# Patient Record
Sex: FEMALE | Race: WHITE | Employment: OTHER | URBAN - METROPOLITAN AREA
[De-identification: names, ages, dates, MRNs, and addresses within clinical notes are randomized per-mention and may not be internally consistent; named-entity substitution may affect disease eponyms.]

---

## 2017-01-06 ENCOUNTER — HOSPITAL ENCOUNTER (OUTPATIENT)
Dept: CT IMAGING | Age: 62
Discharge: HOME | End: 2017-01-06
Payer: SELF-PAY

## 2017-01-06 DIAGNOSIS — Z13.6 SCREENING FOR ISCHEMIC HEART DISEASE: ICD-10-CM

## 2017-01-06 PROCEDURE — 75571 CT HRT W/O DYE W/CA TEST: CPT

## 2017-01-09 ENCOUNTER — TELEPHONE (OUTPATIENT)
Dept: CARDIAC REHAB | Age: 62
End: 2017-01-09

## 2017-01-09 NOTE — TELEPHONE ENCOUNTER
Called patient to review CT scan results. Verified patient's date of birth. Discussed results of CT scan. Her calcium score is 0. This corresponds to no plaque noted in the coronary arteries. Her risk for a heart attack is very low. The chance of patient developing heart disease at this point is less than 1%. Patient verbalized understanding of results. Will fax report to her PCP, Jerry Kim and RIKA Ayala.

## 2017-04-05 ENCOUNTER — HOSPITAL ENCOUNTER (OUTPATIENT)
Dept: MAMMOGRAPHY | Age: 62
Discharge: HOME | End: 2017-04-05
Payer: OTHER GOVERNMENT

## 2017-04-05 DIAGNOSIS — Z12.31 VISIT FOR SCREENING MAMMOGRAM: ICD-10-CM

## 2017-04-05 PROCEDURE — 77063 BREAST TOMOSYNTHESIS BI: CPT

## 2018-04-06 ENCOUNTER — HOSPITAL ENCOUNTER (OUTPATIENT)
Dept: MAMMOGRAPHY | Age: 63
Discharge: HOME | End: 2018-04-06
Attending: OBSTETRICS & GYNECOLOGY
Payer: OTHER GOVERNMENT

## 2018-04-06 DIAGNOSIS — Z12.39 BREAST CANCER SCREENING: ICD-10-CM

## 2018-04-06 PROCEDURE — 77063 BREAST TOMOSYNTHESIS BI: CPT

## 2018-04-13 ENCOUNTER — HOSPITAL ENCOUNTER (OUTPATIENT)
Dept: LAB | Age: 63
Discharge: HOME | End: 2018-04-13
Payer: OTHER GOVERNMENT

## 2018-04-13 PROCEDURE — 88175 CYTOPATH C/V AUTO FLUID REDO: CPT | Performed by: OBSTETRICS & GYNECOLOGY

## 2018-04-13 PROCEDURE — 87624 HPV HI-RISK TYP POOLED RSLT: CPT | Performed by: OBSTETRICS & GYNECOLOGY

## 2019-04-08 ENCOUNTER — HOSPITAL ENCOUNTER (OUTPATIENT)
Dept: MAMMOGRAPHY | Age: 64
Discharge: HOME | End: 2019-04-08
Attending: OBSTETRICS & GYNECOLOGY
Payer: OTHER GOVERNMENT

## 2019-04-08 DIAGNOSIS — Z12.39 BREAST SCREENING, UNSPECIFIED: ICD-10-CM

## 2019-04-08 PROCEDURE — 77067 SCR MAMMO BI INCL CAD: CPT

## 2020-06-15 ENCOUNTER — HOSPITAL ENCOUNTER (OUTPATIENT)
Dept: MAMMOGRAPHY | Age: 65
Discharge: HOME | End: 2020-06-15
Attending: OBSTETRICS & GYNECOLOGY
Payer: OTHER GOVERNMENT

## 2020-06-15 DIAGNOSIS — Z12.31 VISIT FOR SCREENING MAMMOGRAM: ICD-10-CM

## 2020-06-15 PROCEDURE — 77063 BREAST TOMOSYNTHESIS BI: CPT

## 2020-06-22 ENCOUNTER — HOSPITAL ENCOUNTER (OUTPATIENT)
Dept: MAMMOGRAPHY | Age: 65
Discharge: HOME | End: 2020-06-22
Attending: OBSTETRICS & GYNECOLOGY
Payer: OTHER GOVERNMENT

## 2020-06-22 ENCOUNTER — HOSPITAL ENCOUNTER (OUTPATIENT)
Dept: ULTRASOUND IMAGING | Age: 65
Discharge: HOME | End: 2020-06-22
Attending: OBSTETRICS & GYNECOLOGY
Payer: OTHER GOVERNMENT

## 2020-06-22 DIAGNOSIS — R92.0 MICROCALCIFICATION OF RIGHT BREAST ON MAMMOGRAM: ICD-10-CM

## 2020-06-22 DIAGNOSIS — R92.8 ABNORMALITY OF RIGHT BREAST ON SCREENING MAMMOGRAM: ICD-10-CM

## 2020-06-22 DIAGNOSIS — R92.2 INCONCLUSIVE MAMMOGRAM: ICD-10-CM

## 2020-06-22 PROCEDURE — 76642 ULTRASOUND BREAST LIMITED: CPT

## 2020-06-22 PROCEDURE — 77061 BREAST TOMOSYNTHESIS UNI: CPT

## 2020-06-22 PROCEDURE — 77065 DX MAMMO INCL CAD UNI: CPT

## 2020-07-09 ENCOUNTER — HOSPITAL ENCOUNTER (OUTPATIENT)
Dept: MAMMOGRAPHY | Age: 65
Discharge: HOME | End: 2020-07-09
Attending: OBSTETRICS & GYNECOLOGY
Payer: OTHER GOVERNMENT

## 2020-07-09 DIAGNOSIS — R92.8 ABNORMAL FINDINGS ON DIAGNOSTIC IMAGING OF BREAST: ICD-10-CM

## 2020-07-09 PROCEDURE — 88360 TUMOR IMMUNOHISTOCHEM/MANUAL: CPT

## 2020-07-09 PROCEDURE — 88305 TISSUE EXAM BY PATHOLOGIST: CPT

## 2020-07-09 PROCEDURE — 77030030538 MAM STEREO VAC  BX BREAST RT 1ST LESION W/CLIP AND SPECIMEN

## 2020-07-09 PROCEDURE — 74011000250 HC RX REV CODE- 250: Performed by: RADIOLOGY

## 2020-07-09 RX ORDER — LIDOCAINE HYDROCHLORIDE 10 MG/ML
1-15 INJECTION, SOLUTION EPIDURAL; INFILTRATION; INTRACAUDAL; PERINEURAL
Status: DISCONTINUED | OUTPATIENT
Start: 2020-07-09 | End: 2020-07-13 | Stop reason: HOSPADM

## 2020-07-09 RX ORDER — LIDOCAINE HYDROCHLORIDE AND EPINEPHRINE 10; 10 MG/ML; UG/ML
1-20 INJECTION, SOLUTION INFILTRATION; PERINEURAL
Status: DISCONTINUED | OUTPATIENT
Start: 2020-07-09 | End: 2020-07-13 | Stop reason: HOSPADM

## 2020-07-09 RX ADMIN — LIDOCAINE HYDROCHLORIDE,EPINEPHRINE BITARTRATE 10 ML: 10; .01 INJECTION, SOLUTION INFILTRATION; PERINEURAL at 10:58

## 2020-07-09 RX ADMIN — LIDOCAINE HYDROCHLORIDE 10 ML: 10 INJECTION, SOLUTION EPIDURAL; INFILTRATION; INTRACAUDAL; PERINEURAL at 10:52

## 2020-07-09 NOTE — DISCHARGE INSTRUCTIONS
Patient Education        Stereotactic Breast Biopsy: About This Test  What is it? A stereotactic breast biopsy removes a sample of breast tissue that is looked at under a microscope to check for breast cancer. It uses computerized X-rays to precisely locate where to get the tissue samples. Why is this test done? A breast biopsy is most often done to check a lump found during a breast exam or a suspicious area found on a mammogram or other imaging. A stereotactic test is most useful when a doctor can see a problem in the breast on a mammogram but can't locate it by touch. How do you prepare for the test?  If you take aspirin or some other blood thinner, ask your doctor if you should stop taking it before your test. Make sure that you understand exactly what your doctor wants you to do. These medicines increase the risk of bleeding. How is the test done? · You may sit in a chair. Or you may lie on your stomach on a table that has a hole for your breast to hang through. · When the area in your breast is numb, a small cut (incision) is made in the skin. · Using the imaging, the doctor will guide the needle into the biopsy area. · A sample of breast tissue is taken through the needle. · A small clip is usually inserted into your breast to fabiola the biopsy site. · The needle is removed and pressure is put on the needle site to stop any bleeding. · A bandage is put on the needle site. How long does the test take? The test will take about 60 minutes. Most of the time is spent preparing for the images and finding the area for the biopsy. What happens after the test?  · You'll be told how long it may take to get your results back. · You will probably be able to go home right away. · After a specialist looks at the biopsy sample for signs of cancer, your doctor's office will let you know the results.   · If the test results aren't clear, you may have another biopsy or test.  How can you care for yourself at home?  · You can go back to your usual activities right away. But avoid heavy lifting for 24 hours. · The site may be tender for 2 or 3 days. You may also have some bruising, swelling, or slight bleeding. ? You can use an ice pack. Put ice or a cold pack on the area for 10 to 20 minutes at a time. Put a thin cloth between the ice and your skin. ? Ask your doctor if you can take an over-the-counter pain medicine, such as acetaminophen (Tylenol), ibuprofen (Advil, Motrin), or naproxen (Aleve). Be safe with medicines. Read and follow all instructions on the label. Follow-up care is a key part of your treatment and safety. Be sure to make and go to all appointments, and call your doctor if you are having problems. It's also a good idea to keep a list of the medicines you take. Ask your doctor when you can expect to have your test results. Where can you learn more? Go to http://rosalind-jasmyne.info/  Enter Y286 in the search box to learn more about \"Stereotactic Breast Biopsy: About This Test.\"  Current as of: August 22, 2019               Content Version: 12.5  © 4257-6497 [employer]. Care instructions adapted under license by [employer] (which disclaims liability or warranty for this information). If you have questions about a medical condition or this instruction, always ask your healthcare professional. [place] [address] any warranty or liability for your use of this information. POST STEREOTACTIC BIOPSY INSTRUCTIONS    1. WOUND CARE:   -Please keep an ice pack on the biopsy site for 20 minutes each hour through the day of the procedure. May     discontinue ice at bedtime. Do not apply ice directly to skin, protect by using thin cloth covering.    -The morning after biopsy, you may remove the gauze pad dressing. DO NOT REMOVE STERI-STRIPS(TAPE    STRIPS ON SKIN) FROM BIOPSY SITE. Allow them to wear off in 3-4 days.     2. Please wear your most supportive bra for 24 hours. Sleep in your bra the night of the biopsy. This will reduce the risk of bleeding, also help to hold ice pack in place. 3. You may shower after 24 hours, otherwise please keep site dry. Steri-strips may get wet. 4. If bleeding occurs from the site:      Apply direct pressure to the site, holding it tightly for 5 minutes      If bleeding continues after 5 minutes, call your physician  5. NO HEAVY LIFTING (GREATER THAN 5 lbs), vacuuming and/or sports activities for 48 hours after biopsy  6. Your pathology results are usually complete within 48 hours of the procedure time, excluding weekends. 7. You may take TYLENOL extra strength for pain. NO aspirin or aspirin containing products. 8. Call your doctor for a follow up appointment.

## 2020-07-16 ENCOUNTER — OFFICE VISIT (OUTPATIENT)
Dept: SURGERY | Age: 65
End: 2020-07-16

## 2020-07-16 VITALS
DIASTOLIC BLOOD PRESSURE: 79 MMHG | BODY MASS INDEX: 26.52 KG/M2 | HEIGHT: 66 IN | WEIGHT: 165 LBS | HEART RATE: 70 BPM | TEMPERATURE: 97.3 F | SYSTOLIC BLOOD PRESSURE: 144 MMHG | RESPIRATION RATE: 18 BRPM

## 2020-07-16 DIAGNOSIS — D05.11 DUCTAL CARCINOMA IN SITU (DCIS) OF RIGHT BREAST: Primary | ICD-10-CM

## 2020-07-16 PROBLEM — D05.10 DCIS (DUCTAL CARCINOMA IN SITU): Status: ACTIVE | Noted: 2020-07-16

## 2020-07-16 RX ORDER — LISINOPRIL AND HYDROCHLOROTHIAZIDE 10; 12.5 MG/1; MG/1
TABLET ORAL
COMMUNITY
Start: 2020-06-22 | End: 2020-07-30

## 2020-07-16 RX ORDER — ROSUVASTATIN CALCIUM 20 MG/1
TABLET, COATED ORAL DAILY
COMMUNITY
Start: 2020-05-29

## 2020-07-16 RX ORDER — GLIMEPIRIDE 2 MG/1
TABLET ORAL
COMMUNITY
Start: 2020-05-05

## 2020-07-16 NOTE — H&P (VIEW-ONLY)
Breast Cancer Consult Ms. 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 The area of concern was identified on screenig imaging. She denies palpable mass. She denies nipple discharge. She denies breast pain. There is no family history of breast cancer. Her father had non-Hodgkin's cancer. Her mother had unknown cancer, possibly pancreatic. Her most recent previous mammogram was 4/8/19. Breast/GYN history:   
OB History No obstetric history on file. Past Medical History:  
Diagnosis Date  Diabetes ([facility] [address]  Hypercholesterolemia  Hypertension  Menopause Past Surgical History:  
Procedure Laterality Date  HX ORTHOPAEDIC  1983  
 jaw surgery Current Outpatient Medications on File Prior to Visit Medication Sig Dispense Refill  glimepiride (AMARYL) 2 mg tablet  lisinopril-hydroCHLOROthiazide (PRINZIDE, ZESTORETIC) 10-12.5 mg per tablet TK 1 T PO D    
 rosuvastatin (CRESTOR) 20 mg tablet No current facility-administered medications on file prior to visit. No Known Allergies Social History Tobacco Use  Smoking status: Former Smoker  Smokeless tobacco: Never Used Substance Use Topics  Alcohol use: Yes Comment: occasional social   
 Drug use: Not on file Family History Problem Relation Age of Onset  Cancer Mother  Cancer Father   
     lymphoma  Heart Disease Father ROS: positives are bolded General: fevers, chills, night sweats, fatigue, weight loss, weight gain GI: nausea, vomiting, abdominal pain, change in bowel habits, hematochezia, melena, GERD Integ: dermatitis, abnormal moles HEENT: visual changes, vertigo, epistaxis, dysphagia, hoarseness Cardiac: chest pain, palpitations, HTN, edema, varicosities Resp: cough, shortness of breath, wheezing, hemoptysis, snoring, reactive airway disease : hematuria, dysuria, frequency, urgency, nocturia, stress urinary incontinence MSK: weakness, joint pain, arthritis Endocrine:  thyroid disease, polyuria, polydipsia, polyphagia, poor wound healing, heat intolerance, cold intolerance Lymph/Heme: anemia, bruising, history of blood transfusions Neuro: dizziness, headache, fainting, seizures, stroke Psych: anxiety, depression Physical Exam: 
Visit Vitals /79 (BP 1 Location: Right arm, BP Patient Position: Sitting) Pulse 70 Temp 97.3 °F (36.3 °C) (Oral) Resp 18 Ht 5' 6\" (1.676 m) Wt 74.8 kg (165 lb) BMI 26.63 kg/m² Gen:  No distress Head: normocephalic, atraumatic Mouth: Clear, no overt lesions, oral mucosa pink and moist 
Neck: supple, no masses, no adenopathy, trachea midline Resp: clear bilaterally Cardio: Regular rate and rhythm Abdomen: soft, nontender, nondistended Extremeties: warm, well-perfused Neuro: sensation and strength grossly intact and symmetrical 
Psych: alert and oriented to person, place and time Breasts:  
Right: Examined in both the supine and upright positions. There was no supraclavicular, infraclavicular, or axillary lympadenopathy. There were no dominant masses, no skin changes, no asymmetry identified core biopsy wound clean upper outer Left: Examined in both the supine and upright positions. There was no supraclavicular, infraclavicular, or axillary lympadenopathy. There were no dominant masses, no skin changes, no asymmetry identified Imagin20 right mammo/ultrasound Developing asymmetry with associated indeterminate microcalcifications in the 
upper outer posterior third of the right breast for which biopsy is indicated. 
  
Results discussed with the patient at the time of the examination. 
  
BI-RADS Assessment Category 4: Suspicious 6/15/20 bilateral mammogram 
Right breast focal asymmetry and clustered calcifications. Recommend diagnostic right breast tomosynthesis mammogram and possible right breast ultrasound for 
further evaluation. A result letter will be sent to the patient. 
  
BI-RADS Assessment Category 0: Incomplete. Need additional imaging evaluation. Pathology: 
7/9/20 RIGHT BREAST, CORE BIOPSIES:  
DUCTAL CARCINOMA IN SITU, COMEDO TYPE, NUCLEAR GRADE 3. Estrogen Receptor (ER): Negative (0%, Internal controls present and stain as expected). Impression: 
Patient Active Problem List  
Diagnosis Code  DCIS (ductal carcinoma in situ) D05.10  
  
 
    
 63year old woman with right ER negative ductal carcinoma in-situ (DCIS). We reviewed the pathology in detail. We discussed how DCIS differs from invasive cancer. We discussed that there are many options for treatment of breast cancer. Surgery, chemotherapy, radiation and hormone therapy are all tools that may be used in the treatment of breast cancer. For each patient, we determine which will be most beneficial based on her individual set of circumstances. For some patients all four treatment categories will be recommended. For others one or more of these options are not appropriate. Regarding surgery, there are two main options, lumpectomy or mastectomy. We discussed both in detail. Overall survival and distant recurrence rates are the same. The decision is generally a personal decision more so than a medical one. Lumpectomy is also known as breast conservation surgery or partial mastectomy. The goal is to remove the area of concern as well as surrounding area of uninvolved tissue (\"clear margins\"). Radiation is almost always recommended with lumpectomy to allow for acceptable local recurrence rates. Local recurrence rates are approximately 6% after lumpectomy with radiation.   Risks, benefits and options were discussed in detail to include, but not limited to, bleeding, infection, risks of anesthesia, injury to surrounding structures and other unforeseen events such as stroke, heart attack or death. Mastectomy was then addressed. With mastectomy, almost all of the breast tissue is removed. We are not able to remove 100% of the breast tissue. The risk of local recurrence is approximately 2-4% after mastectomy. The overlying skin is generally numb. Most often, the numbness is permanent. Mastectomy can be performed with or without reconstruction. The reconstruction is performed by the plastic surgeon. Commonly it is a multi-step process with placement of tissue expanders as the first step. If she is interested in reconstruction, I will refer her to plastic surgery. The reconstructed breast differs in many ways from the native breast.  The goal is that in a bra or clothing, no one can tell she has had a mastectomy. Radiation generally is not needed after mastectomy. There are some circumstances, usually based on size, margins, local extension or lymph node status, where post-mastectomy radiation is recommended. Risks, benefits and options were discussed in detail to include, but not limited to, bleeding, infection, risks of anesthesia, injury to surrounding structures and other unforeseen events such as stroke, heart attack or death. Regarding lymph nodes, as she is clinically node negative, she is a candidate for sentinel node biopsy if she elects for mastectomy. If she prefers lumpectomy, sentinel node biopsy would not be necessary as we can go back and perform at a subsequent time in the event invasive cancer is noted on final specimen. We discussed this procedure is a targeted sampling of the axillary lymph nodes to allow for staging of her disease. She will be injected with radioactive isotope as well as blue dye to allow for mapping and removal of the sentinel nodes.   By removing only the sentinel nodes and not performing axillary node dissection, she has a decreased risk of lymphedema (arm swelling) and nerve injury. We did specifically discuss that both of these risks still exist.   Risks, benefits and options were discussed in detail to include, but not limited to, bleeding, infection, risks of anesthesia, injury to surrounding structures and other unforeseen events such as stroke, heart attack or death. If a significant amount of cancer is noted in her lymph nodes, an axillary lymph node dissection may be recommended. In this procedure more, but not all, of the lymph nodes under the arm are removed. The chance of both lymphedema and nerve injury are increased with axillary node dissection compared to sentinel node biopsy. I generally recommend referral to physical therapy and a lymphedema specialist if an axillary node dissection is performed. Chemotherapy was addressed. Chemotherapy is systemic treatment aimed largely to decrease chance of spread or recurrence of cancer. It is administered by a medical oncologist.  Often the decision for chemotherapy is made after final pathology. Chemotherapy is not recommended for DCIS. We discussed radiation. Radiation is a local therapy aimed to decrease the chance of local recurrence. It is administered under the direction of a radiation oncologist.  Radiation is almost always recommended with lumpectomy. It generally is not needed after mastectomy. There are some circumstances, usually based on size, margins, local extension or lymph node status, where post-mastectomy radiation is recommended. Most commonly it is administered five days a week for up to seven weeks. Most of the side effects, with the exception of fatigue, are local.   
 
Anti-hormone or hormone blocking therapy is used in hormone sensitive, estrogen receptor positive breast cancer. It is generally recommended for 5-10 years. There are two categories of hormone blocking medications. Tamoxifen is a selective estrogen reuptake modulator (SERM). There are several aromatase inhibitors. These medications are generally prescribed by a medical oncologist.   
 
 
After discussing the above, Ms. Issac Hodgkin prefers right partial mastectomy with localization. We will schedule surgery and radiation oncology consultation. We will plan for medical oncology after surgery. All questions were answered. She was asked to call with any additional questions or concerns. Cc: Anil Matt MD

## 2020-07-16 NOTE — LETTER
7/16/2020 11:52 AM 
 
Patient:  Saúl Fleming YOB: 1955 Date of Visit: 7/16/2020 Bebe Chow MD 
[address] [place] [address],[address] 1 [facility] [phone] VIA Facsimile: [phone] Tiff Glover MD 
[employer] [phone] VIA Facsimile: [phone] Dear MD Tiff Boyle MD, 
 
 
I had the pleasure of seeing Ms. Saúl Fleming in my office today for her newly diagnosed breast cancer. I am including a copy of my office visit today. If you have questions, please do not hesitate to call me. I look forward to following Ms. Juares along with you and will keep you updated as to her progress. Sincerely, Margie Nance MD

## 2020-07-16 NOTE — PROGRESS NOTES
Breast Cancer Consult      Ms. 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 The area of concern was identified on screenig imaging. She denies palpable mass. She denies nipple discharge. She denies breast pain. There is no family history of breast cancer. Her father had non-Hodgkin's cancer. Her mother had unknown cancer, possibly pancreatic. Her most recent previous mammogram was 4/8/19. Breast/GYN history:    OB History    No obstetric history on file. Past Medical History:   Diagnosis Date    Diabetes ([facility] [address]     Hypercholesterolemia     Hypertension     Menopause        Past Surgical History:   Procedure Laterality Date    HX ORTHOPAEDIC  1983    jaw surgery       Current Outpatient Medications on File Prior to Visit   Medication Sig Dispense Refill    glimepiride (AMARYL) 2 mg tablet       lisinopril-hydroCHLOROthiazide (PRINZIDE, ZESTORETIC) 10-12.5 mg per tablet TK 1 T PO D      rosuvastatin (CRESTOR) 20 mg tablet        No current facility-administered medications on file prior to visit.         No Known Allergies    Social History     Tobacco Use    Smoking status: Former Smoker    Smokeless tobacco: Never Used   Substance Use Topics    Alcohol use: Yes     Comment: occasional social     Drug use: Not on file       Family History   Problem Relation Age of Onset    Cancer Mother     Cancer Father         lymphoma    Heart Disease Father          ROS: positives are bolded  General: fevers, chills, night sweats, fatigue, weight loss, weight gain  GI: nausea, vomiting, abdominal pain, change in bowel habits, hematochezia, melena, GERD  Integ: dermatitis, abnormal moles  HEENT: visual changes, vertigo, epistaxis, dysphagia, hoarseness  Cardiac: chest pain, palpitations, HTN, edema, varicosities  Resp: cough, shortness of breath, wheezing, hemoptysis, snoring, reactive airway disease  : hematuria, dysuria, frequency, urgency, nocturia, stress urinary incontinence   MSK: weakness, joint pain, arthritis  Endocrine:  thyroid disease, polyuria, polydipsia, polyphagia, poor wound healing, heat intolerance, cold intolerance  Lymph/Heme: anemia, bruising, history of blood transfusions  Neuro: dizziness, headache, fainting, seizures, stroke  Psych: anxiety, depression    Physical Exam:  Visit Vitals  /79 (BP 1 Location: Right arm, BP Patient Position: Sitting)   Pulse 70   Temp 97.3 °F (36.3 °C) (Oral)   Resp 18   Ht 5' 6\" (1.676 m)   Wt 74.8 kg (165 lb)   BMI 26.63 kg/m²       Gen:  No distress  Head: normocephalic, atraumatic  Mouth: Clear, no overt lesions, oral mucosa pink and moist  Neck: supple, no masses, no adenopathy, trachea midline  Resp: clear bilaterally  Cardio: Regular rate and rhythm  Abdomen: soft, nontender, nondistended  Extremeties: warm, well-perfused  Neuro: sensation and strength grossly intact and symmetrical  Psych: alert and oriented to person, place and time  Breasts:   Right: Examined in both the supine and upright positions. There was no supraclavicular, infraclavicular, or axillary lympadenopathy. There were no dominant masses, no skin changes, no asymmetry identified core biopsy wound clean upper outer  Left: Examined in both the supine and upright positions. There was no supraclavicular, infraclavicular, or axillary lympadenopathy. There were no dominant masses, no skin changes, no asymmetry identified       Imagin20 right mammo/ultrasound  Developing asymmetry with associated indeterminate microcalcifications in the  upper outer posterior third of the right breast for which biopsy is indicated.     Results discussed with the patient at the time of the examination.     BI-RADS Assessment Category 4: Suspicious     6/15/20 bilateral mammogram  Right breast focal asymmetry and clustered calcifications.  Recommend diagnostic  right breast tomosynthesis mammogram and possible right breast ultrasound for  further evaluation. A result letter will be sent to the patient.     BI-RADS Assessment Category 0: Incomplete. Need additional imaging evaluation. Pathology:  7/9/20   RIGHT BREAST, CORE BIOPSIES:   DUCTAL CARCINOMA IN SITU, COMEDO TYPE, NUCLEAR GRADE 3. Estrogen Receptor (ER): Negative (0%, Internal controls present and stain as expected). Impression:  Patient Active Problem List   Diagnosis Code    DCIS (ductal carcinoma in situ) D05.10              63year old woman with right ER negative ductal carcinoma in-situ (DCIS). We reviewed the pathology in detail. We discussed how DCIS differs from invasive cancer. We discussed that there are many options for treatment of breast cancer. Surgery, chemotherapy, radiation and hormone therapy are all tools that may be used in the treatment of breast cancer. For each patient, we determine which will be most beneficial based on her individual set of circumstances. For some patients all four treatment categories will be recommended. For others one or more of these options are not appropriate. Regarding surgery, there are two main options, lumpectomy or mastectomy. We discussed both in detail. Overall survival and distant recurrence rates are the same. The decision is generally a personal decision more so than a medical one. Lumpectomy is also known as breast conservation surgery or partial mastectomy. The goal is to remove the area of concern as well as surrounding area of uninvolved tissue (\"clear margins\"). Radiation is almost always recommended with lumpectomy to allow for acceptable local recurrence rates. Local recurrence rates are approximately 6% after lumpectomy with radiation. Risks, benefits and options were discussed in detail to include, but not limited to, bleeding, infection, risks of anesthesia, injury to surrounding structures and other unforeseen events such as stroke, heart attack or death. Mastectomy was then addressed. With mastectomy, almost all of the breast tissue is removed. We are not able to remove 100% of the breast tissue. The risk of local recurrence is approximately 2-4% after mastectomy. The overlying skin is generally numb. Most often, the numbness is permanent. Mastectomy can be performed with or without reconstruction. The reconstruction is performed by the plastic surgeon. Commonly it is a multi-step process with placement of tissue expanders as the first step. If she is interested in reconstruction, I will refer her to plastic surgery. The reconstructed breast differs in many ways from the native breast.  The goal is that in a bra or clothing, no one can tell she has had a mastectomy. Radiation generally is not needed after mastectomy. There are some circumstances, usually based on size, margins, local extension or lymph node status, where post-mastectomy radiation is recommended. Risks, benefits and options were discussed in detail to include, but not limited to, bleeding, infection, risks of anesthesia, injury to surrounding structures and other unforeseen events such as stroke, heart attack or death. Regarding lymph nodes, as she is clinically node negative, she is a candidate for sentinel node biopsy if she elects for mastectomy. If she prefers lumpectomy, sentinel node biopsy would not be necessary as we can go back and perform at a subsequent time in the event invasive cancer is noted on final specimen. We discussed this procedure is a targeted sampling of the axillary lymph nodes to allow for staging of her disease. She will be injected with radioactive isotope as well as blue dye to allow for mapping and removal of the sentinel nodes. By removing only the sentinel nodes and not performing axillary node dissection, she has a decreased risk of lymphedema (arm swelling) and nerve injury.   We did specifically discuss that both of these risks still exist. Risks, benefits and options were discussed in detail to include, but not limited to, bleeding, infection, risks of anesthesia, injury to surrounding structures and other unforeseen events such as stroke, heart attack or death. If a significant amount of cancer is noted in her lymph nodes, an axillary lymph node dissection may be recommended. In this procedure more, but not all, of the lymph nodes under the arm are removed. The chance of both lymphedema and nerve injury are increased with axillary node dissection compared to sentinel node biopsy. I generally recommend referral to physical therapy and a lymphedema specialist if an axillary node dissection is performed. Chemotherapy was addressed. Chemotherapy is systemic treatment aimed largely to decrease chance of spread or recurrence of cancer. It is administered by a medical oncologist.  Often the decision for chemotherapy is made after final pathology. Chemotherapy is not recommended for DCIS. We discussed radiation. Radiation is a local therapy aimed to decrease the chance of local recurrence. It is administered under the direction of a radiation oncologist.  Radiation is almost always recommended with lumpectomy. It generally is not needed after mastectomy. There are some circumstances, usually based on size, margins, local extension or lymph node status, where post-mastectomy radiation is recommended. Most commonly it is administered five days a week for up to seven weeks. Most of the side effects, with the exception of fatigue, are local.      Anti-hormone or hormone blocking therapy is used in hormone sensitive, estrogen receptor positive breast cancer. It is generally recommended for 5-10 years. There are two categories of hormone blocking medications. Tamoxifen is a selective estrogen reuptake modulator (SERM). There are several aromatase inhibitors.   These medications are generally prescribed by a medical oncologist.        After discussing the above, Ms. Colette Espino prefers right partial mastectomy with localization. We will schedule surgery and radiation oncology consultation. We will plan for medical oncology after surgery. All questions were answered. She was asked to call with any additional questions or concerns. Cc: Jovani Samuel MD

## 2020-07-16 NOTE — PATIENT INSTRUCTIONS
If you have any questions or concerns about today's appointment, the verbal and/or written instructions you were given for follow up care, please call our office at [phone]. [address] [facility]  [id number] [place] [address] [address]by [address]    [phone] office  [phone] fax      Appointment: Wednesday, July 29, 2020 at 7:30am with Dr. Porfirio Zendejas at [facility] located at [address] [facility], [address] [place] [address] [address], [facility] [address] ([address]   [id number] [place] [address] Road  [phone]    Before Surgery Instructions:   1) You must have someone available to drive you to and from your procedure and stay with you for the first 24 hours. 2) It is very important that you have nothing to eat or drink after midnight the night before your surgery. This includes chewing gum or sucking on hard candy. Take only heart, blood pressure and cholesterol medications the morning of surgery with only a sip of water. 3) Please stop taking Plavix 10 days prior to your surgery. Stop taking Coumadin 5 days prior to your surgery. Stop taking all Aspirin or Aspirin containing products 7 days prior to your surgery. Stop taking Advil, Motrin, Aleve, and etc. 3 days prior to your surgery. 4) If you take any diabetic medications please consult with your primary care physician on how to take them on the day of your surgery  5) Please stop all Herbal products 2 weeks prior to your surgery. 6) Please arrive at the hospital 2 hours prior to your surgery, unless you have been otherwise instructed. Any required labs/urine drug screen/x-rays will be performed on day of surgery. 7) If you are of child bearing age you will have pregnancy test done the morning of your surgery as soon as you arrive.   8) Patients having an operation on their colon will be given a separate instruction sheet on their Bowel Prep. 9) For any pre-operative work up check in at the main entrance to [facility], and then go to Patient Registration. These studies are done on a walk in basis they are open from 7:00am to 5:00pm Monday through Friday. 10) Please wash your surgical site the morning of your surgery with antibacterial (i.e. Dial) soap and water. 11) You will be contacted by a hospital preadmission nurse approximately one week prior to scheduled surgery to discuss additional instructions and to review your medications. 12) You may be contacted to change your surgery time. At times this is necessary due to equipment, staffing needs or in the event of a cancellation. 13) Please have COVID 19 test done on Thursday, August 20, 2020 walk in basis 7am to 11am check in at patient registration located behind the information desk. Please be advised it's your responsibility to notify our office of any changes to your healthcare coverage. Failure to notify our office of any changes to your health care coverage may result in denial of payment by your health insurance for all incurred services and you would be responsible for payment for all incurred services. Surgery Date and Time: Tuesday, August 25, 2020 at 7:30am    Please check in at [address], enter through the main entrance and go to patient registration located behind the information desk. Please check in by 6:00am the day of your surgery. After Surgery Instructions: You will need to be seen in the office for a follow-up visit 7-14 days after your surgery. Please call after you have had the procedure to make this appointment. Unless otherwise instructed, you may remove your outer bandage and shower 48 hours after your surgery. If you develop a fever greater than 101, have any significant drainage, bleeding, swelling and/or pus of the wound. Please call our office immediately.     You may contact Cole Toussaint with any questions at [phone].

## 2020-07-23 ENCOUNTER — NURSE NAVIGATOR (OUTPATIENT)
Dept: OTHER | Age: 65
End: 2020-07-23

## 2020-07-23 ENCOUNTER — TELEPHONE (OUTPATIENT)
Dept: SURGERY | Age: 65
End: 2020-07-23

## 2020-07-23 NOTE — NURSE NAVIGATOR
Initial assessment for Christian Kinney, newly diagnosed with right breast cancer. Meeting with pt included telephone interview. Patient was assessed for the following barriers:    Communication:       Yes    No  -Ability to read/write,understand [place]       [x]      []    -Ability to talk with family/children,friends about diagnosis     [x]      []    -Other:  Comments/Referrals/Services provided: Patient is able to discuss diagnosis with family and friends. 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 Patient has insurance with [employer]. No financial concerns today. Psychosocial        Yes    No  Housing:  -Homeless           []      [x]    -Extended care needs: long term care/home care/Hospice     []      [x]    -Other:  Comments/Referrals/Services provided: Patient owns home. Transportation:       Yes    No  -Lack of vehicle or public transportation options      []      [x]    -Funds need for public transportation: bus/taxi      []      []    -Other:  Comments/Referrals/Services provided: Patient drives self. Support system:       Yes No  -Family members at home: spouse/significant other/children    [x]      []    -Has a support system         [x]      []    Other:  Comments/Referrals/Services provided: Patient has good support system with family and friends. Lives with , has 3 adult children, one child  in .  All children lives in [place]. Spirituality:        Yes No  -Spiritual issues          []      [x]    -Cultural concerns          []      [x]    -Other:  -Comments/Referrals/Services provided: No concerns today. Pt is Rastafari. Sexuality:        Yes No  -Body image concerns                     []      [x]    -Relationships/significant other issues        []      [x]    -Other:  Comments/Referrals/Services provided: No concerns today. Coping:        Yes    No  -Able to manage emotions         [x]      []    -Feeling fearful or anxious         []      [x]    -Interest in attending support groups        []      [x]    -Cancer related pain/control         []      [x]    -Other:  Comments/Referrals/Services provided: Patient is calm today, states that she sometimes become anxious when attending doctor's appointments. Tobacco dependency:      Yes No  -Currently smokes: cigarettes/cigars        []      [x]    -Uses smokeless tobacco         []      [x]    -Other:  Comments/Referrals/Services provided: Patient denies smoking. Education/review of Disease process and Management  Yes No  -Explanation of navigator role/contact information      [x]      []    New Patient Guide for Breast Cancer provided                      [x]     []         -Pathology/Staging          [x]      []    -Diagnostic tests          []      [x]    -Genetic testing needed         []      [x]    -Treatment options/plan: surgery/chemotherapy/radiation     [x]      []    -Mediport placement as needed                              []      [x]    -Tobacco cessation as needed        []      [x]    -Need for a second opinion         []      [x]    -Importance of bringing family/friends to medical appts     [x]      []   -Other:  Patient/family verbalized understanding of treatment plan: Yes. Dr. Aniket Morrison discussed breast cancer treatment options with patient on 7/16/20.  After discussion patient opted to have right partial mastectomy on 7/31/20, tag placement scheduled for , covid testing scheduled for 20. Pre and postop surgical instructions given to patient. Patient received breast cancer book guide and gift bag. All questions answered. NCCN Distress Tool    Thelma Bradley  was assessed for management of distress using the NCCN Distress Thermometer for Patients tool. Mild to moderate distress  Scorin-4        Yes    No    -Practical/physical issues       []      [x]      -Provide community resources      [x]      []      -Provide list of support groups      [x]      []      -Provide list of national organizations and websites               [x]      []      -Provide ongoing supportive care by oncology medical team        [x]      []                  Comments/Referrals/Services provided:  Yes. Score-3    Moderate to severe distress  Scorin or greater                   Yes    No    -Navigator consulted with MD      []      []     -Navigator follow up         []      []     -Spiritual concerns        []      []     -Emotional/family concerns       []      []     -Refer to /mental health professional/PCP   []      []      Comments/Referral/Services provided:  Yes.

## 2020-07-27 ENCOUNTER — HOSPITAL ENCOUNTER (OUTPATIENT)
Dept: MAMMOGRAPHY | Age: 65
Discharge: HOME | End: 2020-07-27
Attending: SURGERY
Payer: OTHER GOVERNMENT

## 2020-07-27 ENCOUNTER — HOSPITAL ENCOUNTER (OUTPATIENT)
Dept: LAB | Age: 65
Discharge: HOME | End: 2020-07-27
Attending: SURGERY
Payer: OTHER GOVERNMENT

## 2020-07-27 ENCOUNTER — HOSPITAL ENCOUNTER (OUTPATIENT)
Dept: PREADMISSION TESTING | Age: 65
Discharge: HOME | End: 2020-07-27
Attending: SURGERY
Payer: OTHER GOVERNMENT

## 2020-07-27 ENCOUNTER — HOSPITAL ENCOUNTER (OUTPATIENT)
Dept: ULTRASOUND IMAGING | Age: 65
Discharge: HOME | End: 2020-07-27
Attending: SURGERY
Payer: OTHER GOVERNMENT

## 2020-07-27 DIAGNOSIS — Z01.818 PREOPERATIVE TESTING: ICD-10-CM

## 2020-07-27 DIAGNOSIS — D05.11 DUCTAL CARCINOMA IN SITU (DCIS) OF RIGHT BREAST: ICD-10-CM

## 2020-07-27 DIAGNOSIS — Z85.3 PERSONAL HISTORY OF MALIGNANT NEOPLASM OF BREAST: ICD-10-CM

## 2020-07-27 LAB
ANION GAP SERPL CALC-SCNC: 3 MMOL/L (ref 3–18)
BASOPHILS # BLD: 0 K/UL (ref 0–0.1)
BASOPHILS NFR BLD: 1 % (ref 0–2)
BUN SERPL-MCNC: 16 MG/DL (ref 7–18)
BUN/CREAT SERPL: 20 (ref 12–20)
CALCIUM SERPL-MCNC: 8.8 MG/DL (ref 8.5–10.1)
CHLORIDE SERPL-SCNC: 105 MMOL/L (ref 100–111)
CO2 SERPL-SCNC: 31 MMOL/L (ref 21–32)
CREAT SERPL-MCNC: 0.81 MG/DL (ref 0.6–1.3)
DIFFERENTIAL METHOD BLD: NORMAL
EOSINOPHIL # BLD: 0.2 K/UL (ref 0–0.4)
EOSINOPHIL NFR BLD: 2 % (ref 0–5)
ERYTHROCYTE [DISTWIDTH] IN BLOOD BY AUTOMATED COUNT: 11.9 % (ref 11.6–14.5)
GLUCOSE SERPL-MCNC: 120 MG/DL (ref 74–99)
HCT VFR BLD AUTO: 40.5 % (ref 35–45)
HGB BLD-MCNC: 13.7 G/DL (ref 12–16)
LYMPHOCYTES # BLD: 2 K/UL (ref 0.9–3.6)
LYMPHOCYTES NFR BLD: 30 % (ref 21–52)
MCH RBC QN AUTO: 31.4 PG (ref 24–34)
MCHC RBC AUTO-ENTMCNC: 33.8 G/DL (ref 31–37)
MCV RBC AUTO: 92.7 FL (ref 74–97)
MONOCYTES # BLD: 0.5 K/UL (ref 0.05–1.2)
MONOCYTES NFR BLD: 8 % (ref 3–10)
NEUTS SEG # BLD: 3.9 K/UL (ref 1.8–8)
NEUTS SEG NFR BLD: 59 % (ref 40–73)
PLATELET # BLD AUTO: 255 K/UL (ref 135–420)
PMV BLD AUTO: 10.8 FL (ref 9.2–11.8)
POTASSIUM SERPL-SCNC: 4.6 MMOL/L (ref 3.5–5.5)
RBC # BLD AUTO: 4.37 M/UL (ref 4.2–5.3)
SODIUM SERPL-SCNC: 139 MMOL/L (ref 136–145)
WBC # BLD AUTO: 6.6 K/UL (ref 4.6–13.2)

## 2020-07-27 PROCEDURE — 19285 PERQ DEV BREAST 1ST US IMAG: CPT

## 2020-07-27 PROCEDURE — 87635 SARS-COV-2 COVID-19 AMP PRB: CPT

## 2020-07-27 PROCEDURE — 77065 DX MAMMO INCL CAD UNI: CPT

## 2020-07-27 PROCEDURE — 36415 COLL VENOUS BLD VENIPUNCTURE: CPT

## 2020-07-27 PROCEDURE — 85025 COMPLETE CBC W/AUTO DIFF WBC: CPT

## 2020-07-27 PROCEDURE — 74011000250 HC RX REV CODE- 250: Performed by: RADIOLOGY

## 2020-07-27 PROCEDURE — 80048 BASIC METABOLIC PNL TOTAL CA: CPT

## 2020-07-27 PROCEDURE — 93005 ELECTROCARDIOGRAM TRACING: CPT

## 2020-07-27 RX ORDER — LIDOCAINE HYDROCHLORIDE 10 MG/ML
10 INJECTION INFILTRATION; PERINEURAL
Status: COMPLETED | OUTPATIENT
Start: 2020-07-27 | End: 2020-07-27

## 2020-07-27 RX ADMIN — LIDOCAINE HYDROCHLORIDE 7 ML: 10 INJECTION, SOLUTION INFILTRATION; PERINEURAL at 10:32

## 2020-07-28 LAB
ATRIAL RATE: 68 BPM
CALCULATED P AXIS, ECG09: 50 DEGREES
CALCULATED R AXIS, ECG10: 49 DEGREES
CALCULATED T AXIS, ECG11: 69 DEGREES
DIAGNOSIS, 93000: NORMAL
P-R INTERVAL, ECG05: 174 MS
Q-T INTERVAL, ECG07: 404 MS
QRS DURATION, ECG06: 88 MS
QTC CALCULATION (BEZET), ECG08: 429 MS
SARS-COV-2, COV2NT: NOT DETECTED
VENTRICULAR RATE, ECG03: 68 BPM

## 2020-07-29 ENCOUNTER — HOSPITAL ENCOUNTER (OUTPATIENT)
Dept: RADIATION THERAPY | Age: 65
Discharge: HOME | End: 2020-07-29
Payer: OTHER GOVERNMENT

## 2020-07-29 PROCEDURE — 99211 OFF/OP EST MAY X REQ PHY/QHP: CPT

## 2020-07-30 ENCOUNTER — ANESTHESIA EVENT (OUTPATIENT)
Dept: SURGERY | Age: 65
End: 2020-07-30
Payer: OTHER GOVERNMENT

## 2020-07-30 RX ORDER — ASPIRIN 81 MG/1
81 TABLET ORAL DAILY
COMMUNITY

## 2020-07-30 RX ORDER — LISINOPRIL 10 MG/1
TABLET ORAL
COMMUNITY
Start: 2020-07-20

## 2020-07-30 RX ORDER — HYDROCHLOROTHIAZIDE 12.5 MG/1
TABLET ORAL
COMMUNITY
Start: 2020-07-20

## 2020-07-30 NOTE — PERIOP NOTES
PAT - SURGICAL PRE-ADMISSION INSTRUCTIONS    NAME:  Issac Hodgkin                                                          TODAY'S DATE:  7/30/2020    SURGERY DATE:  7/31/2020                                  SURGERY ARRIVAL TIME:   0530 TBV    1. Do NOT eat or drink anything, including candy or gum, after MIDNIGHT on 7/30/20 , unless you have specific instructions from your Surgeon or Anesthesia Provider to do so. 2. No smoking on the day of surgery. 3. No alcohol 24 hours prior to the day of surgery. 4. No recreational drugs for one week prior to the day of surgery. 5. Leave all valuables, including money/purse, at home. 6. Remove all jewelry, nail polish, makeup (including mascara); no lotions, powders, deodorant, or perfume/cologne/after shave. 7. Glasses/Contact lenses and Dentures may be worn to the hospital.  They will be removed prior to surgery. 8. Call your doctor if symptoms of a cold or illness develop within 24 ours prior to surgery. 9. AN ADULT MUST DRIVE YOU HOME AFTER OUTPATIENT SURGERY. 10. If you are having an OUTPATIENT procedure, please make arrangements for a responsible adult to be with you for 24 hours after your surgery. 11. If you are admitted to the hospital, you will be assigned to a bed after surgery is complete. Normally a family member will not be able to see you until you are in your assigned bed. 15. Family is encouraged to accompany you to the hospital.  We ask visitors in the treatment area to be limited to ONE person at a time to ensure patient privacy. EXCEPTIONS WILL BE MADE AS NEEDED. 15. Children under 12 are discouraged from entering the treatment area and need to be supervised by an adult when in the waiting room. Special Instructions:     Take these medications the morning of surgery with a sip of water:  CRESTOR, STOP anticoagulants AT LEAST 1 WEEK PRIOR to your surgery or, follow other MD instructions:  ASPIRIN    Patient Prep:    use CHG solution    These surgical instructions were reviewed with PATIENT during the PAT PHONE CALL. Directions: On the morning of surgery, please go to the MAIN ENTRANCE. Sign in at the Registration Desk.     If you have any questions and/or concerns, please do not hesitate to call:  (Prior to the day of surgery)  [facility] unit:  [phone]  (Day of surgery)  [facility] unit:  [phone]

## 2020-07-31 ENCOUNTER — APPOINTMENT (OUTPATIENT)
Dept: MAMMOGRAPHY | Age: 65
End: 2020-07-31
Attending: SURGERY
Payer: OTHER GOVERNMENT

## 2020-07-31 ENCOUNTER — HOSPITAL ENCOUNTER (OUTPATIENT)
Age: 65
Setting detail: OUTPATIENT SURGERY
Discharge: HOME | End: 2020-07-31
Attending: SURGERY | Admitting: SURGERY
Payer: OTHER GOVERNMENT

## 2020-07-31 ENCOUNTER — ANESTHESIA (OUTPATIENT)
Dept: SURGERY | Age: 65
End: 2020-07-31
Payer: OTHER GOVERNMENT

## 2020-07-31 VITALS
WEIGHT: 165.5 LBS | TEMPERATURE: 97.5 F | DIASTOLIC BLOOD PRESSURE: 69 MMHG | HEIGHT: 66 IN | RESPIRATION RATE: 16 BRPM | OXYGEN SATURATION: 99 % | SYSTOLIC BLOOD PRESSURE: 138 MMHG | BODY MASS INDEX: 26.6 KG/M2 | HEART RATE: 77 BPM

## 2020-07-31 DIAGNOSIS — Z98.890 STATUS POST RIGHT BREAST LUMPECTOMY: ICD-10-CM

## 2020-07-31 LAB
GLUCOSE BLD STRIP.AUTO-MCNC: 129 MG/DL (ref 70–110)
GLUCOSE BLD STRIP.AUTO-MCNC: 132 MG/DL (ref 70–110)
HBA1C MFR BLD: 5.8 % (ref 4.2–5.6)

## 2020-07-31 PROCEDURE — 74011250636 HC RX REV CODE- 250/636: Performed by: NURSE ANESTHETIST, CERTIFIED REGISTERED

## 2020-07-31 PROCEDURE — 83036 HEMOGLOBIN GLYCOSYLATED A1C: CPT

## 2020-07-31 PROCEDURE — 77030013079 HC BLNKT BAIR HGGR 3M -A: Performed by: NURSE ANESTHETIST, CERTIFIED REGISTERED

## 2020-07-31 PROCEDURE — 74011250636 HC RX REV CODE- 250/636: Performed by: SURGERY

## 2020-07-31 PROCEDURE — 77030012510 HC MSK AIRWY LMA TELE -B: Performed by: ANESTHESIOLOGY

## 2020-07-31 PROCEDURE — 76060000032 HC ANESTHESIA 0.5 TO 1 HR: Performed by: SURGERY

## 2020-07-31 PROCEDURE — 88307 TISSUE EXAM BY PATHOLOGIST: CPT

## 2020-07-31 PROCEDURE — 74011000272 HC RX REV CODE- 272: Performed by: SURGERY

## 2020-07-31 PROCEDURE — 74011000250 HC RX REV CODE- 250: Performed by: NURSE ANESTHETIST, CERTIFIED REGISTERED

## 2020-07-31 PROCEDURE — 77030031139 HC SUT VCRL2 J&J -A: Performed by: SURGERY

## 2020-07-31 PROCEDURE — 76010000138 HC OR TIME 0.5 TO 1 HR: Performed by: SURGERY

## 2020-07-31 PROCEDURE — 76210000063 HC OR PH I REC FIRST 0.5 HR: Performed by: SURGERY

## 2020-07-31 PROCEDURE — 88341 IMHCHEM/IMCYTCHM EA ADD ANTB: CPT

## 2020-07-31 PROCEDURE — 74011000250 HC RX REV CODE- 250: Performed by: SURGERY

## 2020-07-31 PROCEDURE — 76210000020 HC REC RM PH II FIRST 0.5 HR: Performed by: SURGERY

## 2020-07-31 PROCEDURE — 88342 IMHCHEM/IMCYTCHM 1ST ANTB: CPT

## 2020-07-31 PROCEDURE — 74011250637 HC RX REV CODE- 250/637: Performed by: NURSE ANESTHETIST, CERTIFIED REGISTERED

## 2020-07-31 PROCEDURE — 77030040361 HC SLV COMPR DVT MDII -B: Performed by: SURGERY

## 2020-07-31 PROCEDURE — 82962 GLUCOSE BLOOD TEST: CPT

## 2020-07-31 PROCEDURE — 77030002933 HC SUT MCRYL J&J -A: Performed by: SURGERY

## 2020-07-31 PROCEDURE — 77030010516 HC APPL HEMA CLP TELE -B: Performed by: SURGERY

## 2020-07-31 PROCEDURE — 77030002996 HC SUT SLK J&J -A: Performed by: SURGERY

## 2020-07-31 RX ORDER — BUPIVACAINE HYDROCHLORIDE AND EPINEPHRINE 5; 5 MG/ML; UG/ML
INJECTION, SOLUTION EPIDURAL; INTRACAUDAL; PERINEURAL AS NEEDED
Status: DISCONTINUED | OUTPATIENT
Start: 2020-07-31 | End: 2020-07-31 | Stop reason: HOSPADM

## 2020-07-31 RX ORDER — KETOROLAC TROMETHAMINE 15 MG/ML
INJECTION, SOLUTION INTRAMUSCULAR; INTRAVENOUS AS NEEDED
Status: DISCONTINUED | OUTPATIENT
Start: 2020-07-31 | End: 2020-07-31 | Stop reason: HOSPADM

## 2020-07-31 RX ORDER — SODIUM CHLORIDE, SODIUM LACTATE, POTASSIUM CHLORIDE, CALCIUM CHLORIDE 600; 310; 30; 20 MG/100ML; MG/100ML; MG/100ML; MG/100ML
25 INJECTION, SOLUTION INTRAVENOUS CONTINUOUS
Status: DISCONTINUED | OUTPATIENT
Start: 2020-07-31 | End: 2020-07-31 | Stop reason: HOSPADM

## 2020-07-31 RX ORDER — PROPOFOL 10 MG/ML
INJECTION, EMULSION INTRAVENOUS AS NEEDED
Status: DISCONTINUED | OUTPATIENT
Start: 2020-07-31 | End: 2020-07-31 | Stop reason: HOSPADM

## 2020-07-31 RX ORDER — ONDANSETRON 2 MG/ML
4 INJECTION INTRAMUSCULAR; INTRAVENOUS ONCE
Status: DISCONTINUED | OUTPATIENT
Start: 2020-07-31 | End: 2020-07-31 | Stop reason: HOSPADM

## 2020-07-31 RX ORDER — CEFAZOLIN SODIUM 2 G/50ML
2 SOLUTION INTRAVENOUS
Status: COMPLETED | OUTPATIENT
Start: 2020-07-31 | End: 2020-07-31

## 2020-07-31 RX ORDER — NALOXONE HYDROCHLORIDE 0.4 MG/ML
0.04 INJECTION, SOLUTION INTRAMUSCULAR; INTRAVENOUS; SUBCUTANEOUS AS NEEDED
Status: DISCONTINUED | OUTPATIENT
Start: 2020-07-31 | End: 2020-07-31 | Stop reason: HOSPADM

## 2020-07-31 RX ORDER — DEXAMETHASONE SODIUM PHOSPHATE 4 MG/ML
INJECTION, SOLUTION INTRA-ARTICULAR; INTRALESIONAL; INTRAMUSCULAR; INTRAVENOUS; SOFT TISSUE AS NEEDED
Status: DISCONTINUED | OUTPATIENT
Start: 2020-07-31 | End: 2020-07-31 | Stop reason: HOSPADM

## 2020-07-31 RX ORDER — ONDANSETRON 2 MG/ML
INJECTION INTRAMUSCULAR; INTRAVENOUS AS NEEDED
Status: DISCONTINUED | OUTPATIENT
Start: 2020-07-31 | End: 2020-07-31 | Stop reason: HOSPADM

## 2020-07-31 RX ORDER — SODIUM CHLORIDE 0.9 % (FLUSH) 0.9 %
5-40 SYRINGE (ML) INJECTION EVERY 8 HOURS
Status: DISCONTINUED | OUTPATIENT
Start: 2020-07-31 | End: 2020-07-31 | Stop reason: HOSPADM

## 2020-07-31 RX ORDER — SODIUM CHLORIDE, SODIUM LACTATE, POTASSIUM CHLORIDE, CALCIUM CHLORIDE 600; 310; 30; 20 MG/100ML; MG/100ML; MG/100ML; MG/100ML
125 INJECTION, SOLUTION INTRAVENOUS CONTINUOUS
Status: DISCONTINUED | OUTPATIENT
Start: 2020-07-31 | End: 2020-07-31 | Stop reason: HOSPADM

## 2020-07-31 RX ORDER — INSULIN LISPRO 100 [IU]/ML
INJECTION, SOLUTION INTRAVENOUS; SUBCUTANEOUS ONCE
Status: DISCONTINUED | OUTPATIENT
Start: 2020-07-31 | End: 2020-07-31 | Stop reason: HOSPADM

## 2020-07-31 RX ORDER — MIDAZOLAM HYDROCHLORIDE 1 MG/ML
INJECTION, SOLUTION INTRAMUSCULAR; INTRAVENOUS AS NEEDED
Status: DISCONTINUED | OUTPATIENT
Start: 2020-07-31 | End: 2020-07-31 | Stop reason: HOSPADM

## 2020-07-31 RX ORDER — FENTANYL CITRATE 50 UG/ML
50 INJECTION, SOLUTION INTRAMUSCULAR; INTRAVENOUS AS NEEDED
Status: DISCONTINUED | OUTPATIENT
Start: 2020-07-31 | End: 2020-07-31 | Stop reason: HOSPADM

## 2020-07-31 RX ORDER — SODIUM CHLORIDE 0.9 % (FLUSH) 0.9 %
5-40 SYRINGE (ML) INJECTION AS NEEDED
Status: DISCONTINUED | OUTPATIENT
Start: 2020-07-31 | End: 2020-07-31 | Stop reason: HOSPADM

## 2020-07-31 RX ORDER — LIDOCAINE HYDROCHLORIDE 20 MG/ML
INJECTION, SOLUTION EPIDURAL; INFILTRATION; INTRACAUDAL; PERINEURAL AS NEEDED
Status: DISCONTINUED | OUTPATIENT
Start: 2020-07-31 | End: 2020-07-31 | Stop reason: HOSPADM

## 2020-07-31 RX ORDER — FENTANYL CITRATE 50 UG/ML
INJECTION, SOLUTION INTRAMUSCULAR; INTRAVENOUS AS NEEDED
Status: DISCONTINUED | OUTPATIENT
Start: 2020-07-31 | End: 2020-07-31 | Stop reason: HOSPADM

## 2020-07-31 RX ORDER — OXYCODONE AND ACETAMINOPHEN 5; 325 MG/1; MG/1
1 TABLET ORAL ONCE
Status: DISCONTINUED | OUTPATIENT
Start: 2020-07-31 | End: 2020-07-31 | Stop reason: HOSPADM

## 2020-07-31 RX ORDER — HYDROCODONE BITARTRATE AND ACETAMINOPHEN 5; 325 MG/1; MG/1
1 TABLET ORAL
Qty: 5 TAB | Refills: 0 | Status: SHIPPED | OUTPATIENT
Start: 2020-07-31 | End: 2020-08-03

## 2020-07-31 RX ORDER — HYDROMORPHONE HYDROCHLORIDE 1 MG/ML
0.5 INJECTION, SOLUTION INTRAMUSCULAR; INTRAVENOUS; SUBCUTANEOUS
Status: DISCONTINUED | OUTPATIENT
Start: 2020-07-31 | End: 2020-07-31 | Stop reason: HOSPADM

## 2020-07-31 RX ORDER — FAMOTIDINE 20 MG/1
20 TABLET, FILM COATED ORAL ONCE
Status: COMPLETED | OUTPATIENT
Start: 2020-07-31 | End: 2020-07-31

## 2020-07-31 RX ADMIN — PROPOFOL 150 MG: 10 INJECTION, EMULSION INTRAVENOUS at 07:39

## 2020-07-31 RX ADMIN — FAMOTIDINE 20 MG: 20 TABLET ORAL at 06:24

## 2020-07-31 RX ADMIN — ONDANSETRON 4 MG: 2 SOLUTION INTRAMUSCULAR; INTRAVENOUS at 07:46

## 2020-07-31 RX ADMIN — CEFAZOLIN 2 G: 10 INJECTION, POWDER, FOR SOLUTION INTRAVENOUS at 07:41

## 2020-07-31 RX ADMIN — FENTANYL CITRATE 50 MCG: 50 INJECTION, SOLUTION INTRAMUSCULAR; INTRAVENOUS at 07:42

## 2020-07-31 RX ADMIN — LIDOCAINE HYDROCHLORIDE 100 MG: 20 INJECTION, SOLUTION INTRAVENOUS at 07:39

## 2020-07-31 RX ADMIN — FENTANYL CITRATE 50 MCG: 50 INJECTION, SOLUTION INTRAMUSCULAR; INTRAVENOUS at 07:46

## 2020-07-31 RX ADMIN — MIDAZOLAM 2 MG: 1 INJECTION INTRAMUSCULAR; INTRAVENOUS at 07:30

## 2020-07-31 RX ADMIN — DEXAMETHASONE SODIUM PHOSPHATE 4 MG: 4 INJECTION, SOLUTION INTRA-ARTICULAR; INTRALESIONAL; INTRAMUSCULAR; INTRAVENOUS; SOFT TISSUE at 07:46

## 2020-07-31 RX ADMIN — KETOROLAC TROMETHAMINE 30 MG: 15 INJECTION, SOLUTION INTRAMUSCULAR; INTRAVENOUS at 07:42

## 2020-07-31 RX ADMIN — SODIUM CHLORIDE, SODIUM LACTATE, POTASSIUM CHLORIDE, AND CALCIUM CHLORIDE 25 ML/HR: 600; 310; 30; 20 INJECTION, SOLUTION INTRAVENOUS at 06:47

## 2020-07-31 NOTE — INTERVAL H&P NOTE
Update History & Physical 
 
The Patient's History and Physical  was reviewed with the patient and I examined the patient. There was no change. The surgical site was confirmed by the patient and me. Patient requesting Santy Renner for pain postoperatively. Allergies noted, confirmed patient has taken and tolerated this or similar medication in the past and this is her choice for narcotic pain medication. We discussed tylenol and ibuprofen may be sufficient, assuming she has not been told to avoid either medication (generally due to concerns for kidneys, stomach or liver). I have advised her to limit acetaminophen from all sources to less than 4,000mg per day and not to drive while taking narcotic pain medication. I have recommended taking narcotic pain medication sparingly and only if needed. If using the narcotic pain medication, I have recommended taking with food. We discussed this class of medication can constipate, so I have encouraged use of Miralax or Milk of Magnesia if constipation occurs. This class of medication can also be addicting. Again I recommend taking narcotic pain medication sparingly and only if needed. Plan:  The risk, benefits, expected outcome, and alternative to the recommended procedure have been discussed with the patient. Patient understands and wants to proceed with the procedure.  
 
Electronically signed by Ari Bower MD on 7/31/2020 at 7:30 AM

## 2020-07-31 NOTE — DISCHARGE INSTRUCTIONS
Patient Education   Patient Education   Learning About Coronavirus (QVCRZ-22)  Coronavirus [phone]): Overview  What is coronavirus (ANFWG-30)? The coronavirus disease (COVID-19) is caused by a virus. It is an illness that was first found in [place], [place], in December 2019. It has since spread worldwide. The virus can cause fever, cough, and trouble breathing. In severe cases, it can cause pneumonia and make it hard to breathe without help. It can cause death. Coronaviruses are a large group of viruses. They cause the common cold. They also cause more serious illnesses like Middle East respiratory syndrome (MERS) and severe acute respiratory syndrome (SARS). COVID-19 is caused by a novel coronavirus. That means it's a new type that has not been seen in people before. This virus spreads person-to-person through droplets from coughing and sneezing. It can also spread when you are close to someone who is infected. And it can spread when you touch something that has the virus on it, such as a doorknob or a tabletop. What can you do to protect yourself from coronavirus (COVID-19)? The best way to protect yourself from getting sick is to:  · Avoid areas where there is an outbreak. · Avoid contact with people who may be infected. · Wash your hands often with soap or alcohol-based hand sanitizers. · Avoid crowds and try to stay at least 6 feet away from other people. · Wash your hands often, especially after you cough or sneeze. Use soap and water, and scrub for at least 20 seconds. If soap and water aren't available, use an alcohol-based hand . · Avoid touching your mouth, nose, and eyes. What can you do to avoid spreading the virus to others? To help avoid spreading the virus to others:  · Cover your mouth with a tissue when you cough or sneeze. Then throw the tissue in the trash. · Use a disinfectant to clean things that you touch often.   · Stay home if you are sick or have been exposed to the virus. Don't go to school, work, or public areas. And don't use public transportation. · If you are sick:  ? Leave your home only if you need to get medical care. But call the doctor's office first so they know you're coming. And wear a face mask, if you have one.  ? If you have a face mask, wear it whenever you're around other people. It can help stop the spread of the virus when you cough or sneeze. ? Clean and disinfect your home every day. Use household  and disinfectant wipes or sprays. Take special care to clean things that you grab with your hands. These include doorknobs, remote controls, phones, and handles on your refrigerator and microwave. And don't forget countertops, tabletops, bathrooms, and computer keyboards. When to call for help  Call 911 anytime you think you may need emergency care. For example, call if:  · You have severe trouble breathing. (You can't talk at all.)  · You have constant chest pain or pressure. · You are severely dizzy or lightheaded. · You are confused or can't think clearly. · Your face and lips have a blue color. · You pass out (lose consciousness) or are very hard to wake up. Call your doctor now if you develop symptoms such as:  · Shortness of breath. · Fever. · Cough. If you need to get care, call ahead to the doctor's office for instructions before you go. Make sure you wear a face mask, if you have one, to prevent exposing other people to the virus. Where can you get the latest information? The following health organizations are tracking and studying this virus. Their websites contain the most up-to-date information. Radha Don also learn what to do if you think you may have been exposed to the virus. · U.S. Centers for Disease Control and Prevention (CDC): The CDC provides updated news about the disease and travel advice. The website also tells you how to prevent the spread of infection.  www.cdc.gov  · World Health Organization [facility]): WHO offers information about the virus outbreaks. WHO also has travel advice. www.who.int  Current as of: April 1, 2020               Content Version: 12.4  © 2006-2020 Healthwise, Incorporated. Care instructions adapted under license by your healthcare professional. If you have questions about a medical condition or this instruction, always ask your healthcare professional. [address] any warranty or liability for your use of this information. Lumpectomy: What to Expect at [facility]     For 1 or 2 days after the surgery you will probably feel tired and have some pain. The skin around the cut (incision) may feel firm, swollen, and tender, and be bruised. Tenderness should go away in about 2 or 3 days, and the bruising within 2 weeks. Firmness and swelling may last for 3 to 6 months. You may feel a soft lump in your breast that gradually turns hard. This is the incision healing. It is not cancer. Women should wear a well-fitted and supportive bra, even during the night, for 1 week. You will probably be able to go back to work or your normal routine in 1 to 3 weeks after the surgery. This may depend on whether you have more treatment. Your doctor may have removed some lymph nodes in your armpit to see if the cancer has spread. If so, you may feel either numbness or tingling (\"pins and needles\") in your armpit or on the inside of your upper arm. This should improve over the next several weeks. Some people have numbness for a longer time. When you find out that you have cancer, you may feel many emotions and may need some help coping. Seek out family, friends, and counselors for support. You also can do things at home to make yourself feel better while you go through treatment. Call the [employer] (5-141.730.2750) or visit its website at [address] [employer] for more information. This care sheet gives you a general idea about how long it will take for you to recover.  But each person recovers at a different pace. Follow the steps below to get better as quickly as possible. How can you care for yourself at home? Activity  · Rest when you feel tired. Getting enough sleep will help you recover. You may want to sleep on the side that has not been operated on. A woman may want to use a pillow to support the affected breast while lying on her side. · Avoid strenuous activities, such as biking, jogging, weightlifting, or aerobic exercise, for 1 month or until your doctor says it is okay. This may include housework, such as washing windows, especially if you have to use the arm next to the affected breast.  · Most people can return to their normal activities within 2 weeks. · Try to walk each day. Start out by walking a little more than you did the day before. Bit by bit, increase the amount you walk. Walking boosts blood flow and helps prevent pneumonia and constipation. · For 1 to 2 weeks, avoid lifting anything over 10 to 15 pounds or that would make you strain. This may include heavy grocery bags and milk containers, a heavy briefcase or backpack, cat litter or dog food bags, a vacuum , or a child. · You may drive when you are no longer taking pain medicine and can use your arm without pain. Talk to your doctor about when to start driving, especially if you are having radiation treatments. · You will probably be able to go back to work or your normal routine in 1 to 3 weeks. It may be longer, depending on the type of work you do and whether you are having radiation or chemotherapy. · You may shower 24 to 48 hours after surgery, if your doctor okays it. Pat the incision dry. Do not take a bath for the first 2 weeks, or until your doctor tells you it is okay. Diet  · You can eat your normal diet. If your stomach is upset, try bland, low-fat foods like plain rice, broiled chicken, toast, and yogurt.   · You may notice that your bowel movements are not regular right after your surgery. This is common. Try to avoid constipation and straining with bowel movements. You may want to take a fiber supplement every day. If you have not had a bowel movement after a couple of days, ask your doctor about taking a mild laxative. Medicines  · Your doctor will tell you if and when you can restart your medicines. He or she will also give you instructions about taking any new medicines. · If you take aspirin or some other blood thinner, ask your doctor if and when to start taking it again. Make sure that you understand exactly what your doctor wants you to do. · Take pain medicines exactly as directed. ? Your doctor may have given you a medicine to numb the area inside and around your cut (incision). The numbness will last from 6 to 12 hours. If you went home right after the surgery, you may want to take pain medicine before this wears off.  ? If the doctor gave you a prescription medicine for pain, take it as prescribed. ? If you are not taking a prescription pain medicine, ask your doctor if you can take an over-the-counter medicine. · If your doctor prescribed antibiotics, take them as directed. Do not stop taking them just because you feel better. You need to take the full course of antibiotics. · If you think your pain medicine is making you sick to your stomach:  ? Take your medicine after meals (unless your doctor has told you not to). ? Ask your doctor for a different pain medicine. Incision care  · If you have strips of tape on the cut the doctor made (incision), leave the tape on for a week or until it falls off. · When you can shower, wash the area daily with warm, soapy water and pat it dry. Follow-up care is a key part of your treatment and safety. Be sure to make and go to all appointments, and call your doctor if you are having problems. It's also a good idea to know your test results and keep a list of the medicines you take. When should you call for help?    [id number] anytime you think you may need emergency care. For example, call if:  · You passed out (lost consciousness). · You have chest pain, are short of breath, or cough up blood. Call your doctor now or seek immediate medical care if:  · You are sick to your stomach or cannot drink fluids. · You cannot pass stools or gas. · You have pain that does not get better after you take your pain medicine. · You have loose stitches, or your incision comes open. · Bright red blood has soaked through the bandage over your incision. · You have signs of a blood clot in your leg (called a deep vein thrombosis), such as:  ? Pain in your calf, back of the knee, thigh, or groin. ? Redness or swelling in your leg. · You have signs of infection, such as:  ? Increased pain, swelling, warmth, or redness. ? Red streaks leading from the incision. ? Pus draining from the incision. ? A fever. Watch closely for changes in your health, and be sure to contact your doctor if:  · You have any problems. · You have new or worse swelling or pain in your arm. Where can you learn more? Go to http://www.gray.com/  Enter D222 in the search box to learn more about \"Lumpectomy: What to Expect at Home. \"  Current as of: August 22, 2019               Content Version: 12.5  © 9552-2163 Healthwise, Incorporated. Care instructions adapted under license by [employer] (which disclaims liability or warranty for this information). If you have questions about a medical condition or this instruction, always ask your healthcare professional. [place] [address] any warranty or liability for your use of this information.          DISCHARGE SUMMARY from Nurse    PATIENT INSTRUCTIONS:    After general anesthesia or intravenous sedation, for 24 hours or while taking prescription Narcotics:  · Limit your activities  · Do not drive and operate hazardous machinery  · Do not make important personal or business decisions  · Do  not drink alcoholic beverages  · If you have not urinated within 8 hours after discharge, please contact your surgeon on call. Report the following to your surgeon:  · Excessive pain, swelling, redness or odor of or around the surgical area  · Temperature over 100.5  · Nausea and vomiting lasting longer than 4 hours or if unable to take medications  · Any signs of decreased circulation or nerve impairment to extremity: change in color, persistent  numbness, tingling, coldness or increase pain  · Any questions    *  Please give a list of your current medications to your Primary Care Provider. *  Please update this list whenever your medications are discontinued, doses are      changed, or new medications (including over-the-counter products) are added. *  Please carry medication information at all times in case of emergency situations. These are general instructions for a healthy lifestyle:    No smoking/ No tobacco products/ Avoid exposure to second hand smoke  Surgeon General's Warning:  Quitting smoking now greatly reduces serious risk to your health. Obesity, smoking, and sedentary lifestyle greatly increases your risk for illness    A healthy diet, regular physical exercise & weight monitoring are important for maintaining a healthy lifestyle    You may be retaining fluid if you have a history of heart failure or if you experience any of the following symptoms:  Weight gain of 3 pounds or more overnight or 5 pounds in a week, increased swelling in our hands or feet or shortness of breath while lying flat in bed. Please call your doctor as soon as you notice any of these symptoms; do not wait until your next office visit. Patient armband removed and shredded  The discharge information has been reviewed with the patient. The patient verbalized understanding.   Discharge medications reviewed with the patient and appropriate educational materials and side effects teaching were provided.   ___________________________________________________________________________________________________________________________________

## 2020-07-31 NOTE — OP NOTES
Date of Procedure: 7/31/2020  Preoperative Diagnosis: Ductal Carcinoma Insitu Right Breast D05.11  Postoperative Diagnosis: * No post-op diagnosis entered *  Procedure(s):  Procedure(s):  Right Partial Mastectomy with Localization    Surgeon(s) and Role:      Angelic Lott MD - Primary         Surgical Staff: Circ-1: Ted Connor RN  Scrub Tech-1: Irasema Pickup  Surg Asst-1: Genny Contreras      Event Time In     Event Time In   Incision Start 7854   Incision Close      Event Time In   Patient In - Facility (Arrived) 0532   Patient In - Pre-op 0600   Anesthesia Start 5170   Patient Ready for [facility]   Surgeon Available 8800   Patient Out - Pre-op 0140   Patient In - OR 4335   Surgeon In [address] [place] [address] 0745   Incision Start 23 [id number]   Incision Close    Surgeon out of OR 0806   Patient Out - OR    Anesthesia Stop    Patient In - Phase I    Notice to Anesthesia    Care Complete - Phase I    Patient Out - Phase I    Patient In - Phase II    Patient Tolerates Liquids    Patient Ready for Visitors    Patient Education Complete    Patient Voided    Care Complete - Phase II    Patient Out - Phase II    End of Periop Care    Patient In - Overflow    Patient Out - Overflow        Anesthesia: General  Anesthesia staff: Anesthesiologist: Billy Whitomre MD  CRNA: Conchita Saini CRNA  Estimated Blood Loss: 5cc  Specimens:   ID Type Source Tests Collected by Time Destination   1 : Right breast mass short superior long lateral tan anterior  Preservative Breast  Chelsi Santillan MD 7/31/2020 4763 Pathology        Findings: clip, tag an calcifications in specimen, posterior margin chest wall   Complications: none noted  Implants: * No implants in log *     The patient was identified in the preoperative holding area and the risks again were reviewed with the patient who understands and agrees.  She understands the risk of bleeding, infection, damage to surrounding structures, hematoma/seroma formation, and the possibility of missing the lesion in question. She also understands additional surgery may be indicated. The patient previously underwent placement of a localizing clip. She was also marked by me to confirm the site and the procedure. The patient was taken to the operating suite and placed supine on the table. Compression stockings were placed and anesthesia induced without complication. She was then prepped and draped in the usual sterile fashion and an appropriate time-out was performed to confirm the patient, the side, and the procedure. Local anesthetic was infiltrated. An incision was made at the axillary position in the right breast. We then dissected into the subcutaneous tissue of the breast towards the localizer clip. We then used electrocautery to remove a core of breast tissue around the localizing tag, attempting to remove the lesion in its entirety with attention to margins, posterior margin being chest wall. There was excellent hemostasis and the specimen was marked for orientation on removal of the tissue. This was then handed off the field for radiographic and pathologic analysis. Clips were placed to fabiola the site. The breast tissue was mobilized to close the defect with 2-0 vicryl. All sponge and instrument counts were reported to me as correct. We continued the closure with a 3-0 vicryl suture, followed by 4-0 monocryl suture. Dermabond was then applied. The family was updated after the procedure. The patient was taken to recovery in good condition.

## 2020-07-31 NOTE — ANESTHESIA POSTPROCEDURE EVALUATION
Procedure(s):  Right Partial Mastectomy with Localization. general    Anesthesia Post Evaluation      Multimodal analgesia: multimodal analgesia used between 6 hours prior to anesthesia start to PACU discharge  Patient location during evaluation: PACU  Patient participation: complete - patient participated  Level of consciousness: awake  Pain management: adequate  Airway patency: patent  Anesthetic complications: no  Cardiovascular status: acceptable  Respiratory status: acceptable  Hydration status: acceptable  Post anesthesia nausea and vomiting:  controlled  Final Post Anesthesia Temperature Assessment:  Normothermia (36.0-37.5 degrees C)      INITIAL Post-op Vital signs:   Vitals Value Taken Time   /71 7/31/2020  8:43 AM   Temp 36.2 °C (97.1 °F) 7/31/2020  8:26 AM   Pulse 75 7/31/2020  8:44 AM   Resp 15 7/31/2020  8:44 AM   SpO2 97 % 7/31/2020  8:44 AM   Vitals shown include unvalidated device data.

## 2020-07-31 NOTE — PERIOP NOTES
Recd care of pt from OR via stretcher. Attached to monitor. VSS. OR, MAR and anesthesia report acknowledged. Will cont to monitor.

## 2020-07-31 NOTE — ANESTHESIA PREPROCEDURE EVALUATION
Relevant Problems   No relevant active problems       Anesthetic History   No history of anesthetic complications       Comments: No recent GA     Review of Systems / Medical History  Patient summary reviewed and pertinent labs reviewed    Pulmonary  Within defined limits                 Neuro/Psych   Within defined limits           Cardiovascular    Hypertension: well controlled                   GI/Hepatic/Renal                Endo/Other    Diabetes: well controlled, type 2    Obesity and cancer     Other Findings              Physical Exam    Airway  Mallampati: I  TM Distance: 4 - 6 cm  Neck ROM: normal range of motion   Mouth opening: Normal     Cardiovascular    Rhythm: regular  Rate: normal         Dental  No notable dental hx       Pulmonary                Comments: Non labored Abdominal         Other Findings            Anesthetic Plan    ASA: 3  Anesthesia type: general          Induction: Intravenous  Anesthetic plan and risks discussed with: Patient

## 2020-08-03 ENCOUNTER — NURSE NAVIGATOR (OUTPATIENT)
Dept: OTHER | Age: 65
End: 2020-08-03

## 2020-08-03 NOTE — NURSE NAVIGATOR
Postop call to patient s/p right lumpectomy on  7/31/20. Patient states that she is doing well. No pain at this time. Denies fever or discharge from surgical site. Patient will follow up with Dr. Balwinder Macdonald on  8/6/2020. All questions answered.

## 2020-08-06 ENCOUNTER — OFFICE VISIT (OUTPATIENT)
Dept: SURGERY | Age: 65
End: 2020-08-06

## 2020-08-06 VITALS
DIASTOLIC BLOOD PRESSURE: 73 MMHG | RESPIRATION RATE: 18 BRPM | HEIGHT: 66 IN | HEART RATE: 68 BPM | SYSTOLIC BLOOD PRESSURE: 138 MMHG | WEIGHT: 167 LBS | BODY MASS INDEX: 26.84 KG/M2 | TEMPERATURE: 97.5 F

## 2020-08-06 DIAGNOSIS — D05.11 DUCTAL CARCINOMA IN SITU (DCIS) OF RIGHT BREAST: Primary | ICD-10-CM

## 2020-08-06 NOTE — LETTER
8/6/2020 10:02 AM 
 
Patient:  Guille Ortiz YOB: 1955 Date of Visit: 8/6/2020 Vel Lund MD 
[address] [place] [address],[address] 1 [facility] [id number] VIA Facsimile: [phone] Karena Saleem MD 
[phone] [id number] VIA Facsimile: [phone] Dear MD Karena Becerra MD, 
 
 
I had the pleasure of seeing Ms. Tenzin Mendieta in my office today for her breast cancer. I am including a copy of my office visit today. If you have questions, please do not hesitate to call me. I look forward to following Ms. Juares along with you and will keep you updated as to her progress. Sincerely, Liv Beltre MD

## 2020-08-06 NOTE — PROGRESS NOTES
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    1. Have you been to the ER, urgent care clinic since your last visit? Hospitalized since your last visit? No    2. Have you seen or consulted any other health care providers outside of the [address] [place] [address] since your last visit? Include any pap smears or colon screening.  No

## 2020-08-06 NOTE — PROGRESS NOTES
Breast Cancer     Ms. 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 She was diagnosed on core biopsy 7/9/20. The area of concern was identified on screenig imaging. She denies palpable mass. She denies nipple discharge. She denies breast pain. There is no family history of breast cancer. Her father had non-Hodgkin's cancer. Her mother had unknown cancer, possibly pancreatic. Her most recent previous mammogram was 4/8/19. Breast/GYN history:    OB History    No obstetric history on file. Past Medical History:   Diagnosis Date    Diabetes ([facility] [address]     Hypercholesteremia     Hypercholesterolemia     Hypertension     Menopause        Past Surgical History:   Procedure Laterality Date    HX BREAST BIOPSY Right 07/27/2020    ul guided biopsy    HX MASTECTOMY Right 7/31/2020    Right Partial Mastectomy with Localization performed by Collins Rios MD at [address]    jaw surgery       Current Outpatient Medications on File Prior to Visit   Medication Sig Dispense Refill    lisinopriL (PRINIVIL, ZESTRIL) 10 mg tablet TK 1 T PO  QD. TAKE WITH HYDROCHLOROTHIAZIDE 12.5 MG.  hydroCHLOROthiazide (HYDRODIURIL) 12.5 mg tablet TK 1 T PO QD. TK WITH LISINOPRIL 10 MG.  aspirin delayed-release 81 mg tablet Take 81 mg by mouth daily.  glimepiride (AMARYL) 2 mg tablet       rosuvastatin (CRESTOR) 20 mg tablet daily. No current facility-administered medications on file prior to visit.         No Known Allergies    Social History     Tobacco Use    Smoking status: Former Smoker    Smokeless tobacco: Never Used   Substance Use Topics    Alcohol use: Yes     Comment: occasional social     Drug use: Never       Family History   Problem Relation Age of Onset    Cancer Mother     Cancer Father         lymphoma    Heart Disease Father          ROS: positives are bolded  General: fevers, chills, night sweats, fatigue, weight loss, weight gain  GI: nausea, vomiting, abdominal pain, change in bowel habits, hematochezia, melena, GERD  Integ: dermatitis, abnormal moles  HEENT: visual changes, vertigo, epistaxis, dysphagia, hoarseness  Cardiac: chest pain, palpitations, HTN, edema, varicosities  Resp: cough, shortness of breath, wheezing, hemoptysis, snoring, reactive airway disease  : hematuria, dysuria, frequency, urgency, nocturia, stress urinary incontinence   MSK: weakness, joint pain, arthritis  Endocrine:  thyroid disease, polyuria, polydipsia, polyphagia, poor wound healing, heat intolerance, cold intolerance  Lymph/Heme: anemia, bruising, history of blood transfusions  Neuro: dizziness, headache, fainting, seizures, stroke  Psych: anxiety, depression    Physical Exam:  Visit Vitals  /73   Pulse 68   Temp 97.5 °F (36.4 °C)   Resp 18   Ht 5' 6\" (1.676 m)   Wt 75.8 kg (167 lb)   BMI 26.95 kg/m²       Gen:  No distress  Head: normocephalic, atraumatic  Mouth: Clear, no overt lesions, oral mucosa pink and moist  Neck: supple, no masses, no adenopathy, trachea midline  Resp: clear bilaterally  Cardio: Regular rate and rhythm  Abdomen: soft, nontender, nondistended  Extremeties: warm, well-perfused  Neuro: sensation and strength grossly intact and symmetrical  Psych: alert and oriented to person, place and time  Breasts:   Right: Examined in both the supine and upright positions. There was no supraclavicular, infraclavicular, or axillary lympadenopathy. There were no dominant masses, no skin changes, no asymmetry identified incision clean upper outer, ecchymosis  Left: Examined in both the supine and upright positions. There was no supraclavicular, infraclavicular, or axillary lympadenopathy.    There were no dominant masses, no skin changes, no asymmetry identified       Imagin20 right mammo/ultrasound  Developing asymmetry with associated indeterminate microcalcifications in the upper outer posterior third of the right breast for which biopsy is indicated.     Results discussed with the patient at the time of the examination.     BI-RADS Assessment Category 4: Suspicious     6/15/20 bilateral mammogram  Right breast focal asymmetry and clustered calcifications. Recommend diagnostic  right breast tomosynthesis mammogram and possible right breast ultrasound for  further evaluation. A result letter will be sent to the patient.     BI-RADS Assessment Category 0: Incomplete. Need additional imaging evaluation. Pathology:  7/31/20   RIGHT BREAST MASS, LUMPECTOMY:   DUCTAL CARCINOMA IN SITU. SPECIMEN   Procedure: Excision (less than total mastectomy)   Specimen Laterality: Right   TUMOR   Histologic Type: Ductal carcinoma in situ   Size (Extent) of DCIS: 24 mm   Nuclear Grade: Grade III (high)   Necrosis: Present, central (expansive \"comedo\" necrosis)   MARGINS   Margins: Uninvolved by DCIS   Distance from Closest Margin (Millimeters): Less than: 1 mm   Closest Margin: Anterior, Posterior   LYMPH NODES   Regional Lymph Nodes: No lymph nodes submitted or found   PATHOLOGIC STAGE CLASSIFICATION (pTNM, AJCC 8th Edition)   Primary Tumor (pT): pTis (DCIS)   Regional Lymph Nodes (pN): pNX     7/9/20   RIGHT BREAST, CORE BIOPSIES:   DUCTAL CARCINOMA IN SITU, COMEDO TYPE, NUCLEAR GRADE 3. Estrogen Receptor (ER): Negative (0%, Internal controls present and stain as expected). 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 The pathology was reviewed. Posterior margin and anterior margin are close. Posterior margin was chest wall. I have recommended reexcision for anterior margin. Ms. Yanira Ludwig is hesitant to proceed with additional surgery.  Given that the surgical cavity was very close to skin and non-invasive nature of DCIS, I think it is resonable to forego reexcision of an anterior skin margin. Ms. Babin May will let me know if she reconsiders and wishes to proceed with surgery. Otherwise we will plan to proceed with radiation. Follow up 1 month. All questions were answered. She was asked to call with any additional questions or concerns. Cc: Tra Hunter MD

## 2020-08-06 NOTE — PATIENT INSTRUCTIONS
If you have any questions or concerns about today's appointment, the verbal and/or written instructions you were given for follow up care, please call our office at [phone].     [facility] - [address], [address] [place] [address] Road    [phone] office  [phone]

## 2020-08-07 ENCOUNTER — TELEPHONE (OUTPATIENT)
Dept: SURGERY | Age: 65
End: 2020-08-07

## 2020-08-07 NOTE — TELEPHONE ENCOUNTER
Spoke to Ms. Xie to inform of appointment Wednesday, August 19, 2020 at 9:00am with Dr. Noam Garcia at [address] [facility] located at [address] [place] [address], [address] [place] [address],[address], [facility] (S) [phone]

## 2020-08-19 ENCOUNTER — HOSPITAL ENCOUNTER (OUTPATIENT)
Dept: RADIATION THERAPY | Age: 65
Discharge: HOME | End: 2020-08-19
Payer: OTHER GOVERNMENT

## 2020-08-19 PROCEDURE — 99211 OFF/OP EST MAY X REQ PHY/QHP: CPT

## 2020-08-24 ENCOUNTER — HOSPITAL ENCOUNTER (OUTPATIENT)
Dept: RADIATION THERAPY | Age: 65
Discharge: HOME | End: 2020-08-24
Payer: OTHER GOVERNMENT

## 2020-08-24 PROCEDURE — 77290 THER RAD SIMULAJ FIELD CPLX: CPT

## 2020-08-24 PROCEDURE — 77332 RADIATION TREATMENT AID(S): CPT

## 2020-08-25 ENCOUNTER — HOSPITAL ENCOUNTER (OUTPATIENT)
Dept: RADIATION THERAPY | Age: 65
Discharge: HOME | End: 2020-08-25
Payer: OTHER GOVERNMENT

## 2020-08-25 PROCEDURE — 77295 3-D RADIOTHERAPY PLAN: CPT

## 2020-08-25 PROCEDURE — 77300 RADIATION THERAPY DOSE PLAN: CPT

## 2020-08-25 PROCEDURE — 77334 RADIATION TREATMENT AID(S): CPT

## 2020-08-26 ENCOUNTER — HOSPITAL ENCOUNTER (OUTPATIENT)
Dept: RADIATION THERAPY | Age: 65
Discharge: HOME | End: 2020-08-26
Payer: OTHER GOVERNMENT

## 2020-08-27 ENCOUNTER — HOSPITAL ENCOUNTER (OUTPATIENT)
Dept: RADIATION THERAPY | Age: 65
Discharge: HOME | End: 2020-08-27
Payer: OTHER GOVERNMENT

## 2020-08-27 PROCEDURE — 77280 THER RAD SIMULAJ FIELD SMPL: CPT

## 2020-08-27 PROCEDURE — 77412 RADIATION TX DELIVERY LVL 3: CPT

## 2020-08-28 ENCOUNTER — HOSPITAL ENCOUNTER (OUTPATIENT)
Dept: RADIATION THERAPY | Age: 65
Discharge: HOME | End: 2020-08-28
Payer: OTHER GOVERNMENT

## 2020-08-28 PROCEDURE — 77412 RADIATION TX DELIVERY LVL 3: CPT

## 2020-08-31 ENCOUNTER — HOSPITAL ENCOUNTER (OUTPATIENT)
Dept: RADIATION THERAPY | Age: 65
Discharge: HOME | End: 2020-08-31
Payer: OTHER GOVERNMENT

## 2020-08-31 PROCEDURE — 77412 RADIATION TX DELIVERY LVL 3: CPT

## 2020-09-01 ENCOUNTER — HOSPITAL ENCOUNTER (OUTPATIENT)
Dept: RADIATION THERAPY | Age: 65
Discharge: HOME | End: 2020-09-01
Payer: OTHER GOVERNMENT

## 2020-09-01 PROCEDURE — 77412 RADIATION TX DELIVERY LVL 3: CPT

## 2020-09-02 ENCOUNTER — HOSPITAL ENCOUNTER (OUTPATIENT)
Dept: RADIATION THERAPY | Age: 65
Discharge: HOME | End: 2020-09-02
Payer: OTHER GOVERNMENT

## 2020-09-02 PROCEDURE — 77334 RADIATION TREATMENT AID(S): CPT

## 2020-09-02 PROCEDURE — 77412 RADIATION TX DELIVERY LVL 3: CPT

## 2020-09-02 PROCEDURE — 77307 TELETHX ISODOSE PLAN CPLX: CPT

## 2020-09-02 PROCEDURE — 77336 RADIATION PHYSICS CONSULT: CPT

## 2020-09-03 ENCOUNTER — HOSPITAL ENCOUNTER (OUTPATIENT)
Dept: RADIATION THERAPY | Age: 65
Discharge: HOME | End: 2020-09-03
Payer: OTHER GOVERNMENT

## 2020-09-03 PROCEDURE — 77412 RADIATION TX DELIVERY LVL 3: CPT

## 2020-09-03 PROCEDURE — 77417 THER RADIOLOGY PORT IMAGE(S): CPT

## 2020-09-04 ENCOUNTER — HOSPITAL ENCOUNTER (OUTPATIENT)
Dept: RADIATION THERAPY | Age: 65
Discharge: HOME | End: 2020-09-04
Payer: OTHER GOVERNMENT

## 2020-09-04 PROCEDURE — 77412 RADIATION TX DELIVERY LVL 3: CPT

## 2020-09-08 ENCOUNTER — HOSPITAL ENCOUNTER (OUTPATIENT)
Dept: RADIATION THERAPY | Age: 65
Discharge: HOME | End: 2020-09-08
Payer: OTHER GOVERNMENT

## 2020-09-08 PROCEDURE — 77412 RADIATION TX DELIVERY LVL 3: CPT

## 2020-09-09 ENCOUNTER — HOSPITAL ENCOUNTER (OUTPATIENT)
Dept: RADIATION THERAPY | Age: 65
Discharge: HOME | End: 2020-09-09
Payer: OTHER GOVERNMENT

## 2020-09-09 PROCEDURE — 77412 RADIATION TX DELIVERY LVL 3: CPT

## 2020-09-10 ENCOUNTER — OFFICE VISIT (OUTPATIENT)
Dept: SURGERY | Age: 65
End: 2020-09-10

## 2020-09-10 ENCOUNTER — NURSE NAVIGATOR (OUTPATIENT)
Dept: OTHER | Age: 65
End: 2020-09-10

## 2020-09-10 ENCOUNTER — HOSPITAL ENCOUNTER (OUTPATIENT)
Dept: RADIATION THERAPY | Age: 65
Discharge: HOME | End: 2020-09-10
Payer: OTHER GOVERNMENT

## 2020-09-10 VITALS
TEMPERATURE: 97.7 F | RESPIRATION RATE: 18 BRPM | HEART RATE: 70 BPM | BODY MASS INDEX: 26.68 KG/M2 | HEIGHT: 66 IN | DIASTOLIC BLOOD PRESSURE: 81 MMHG | WEIGHT: 166 LBS | SYSTOLIC BLOOD PRESSURE: 149 MMHG

## 2020-09-10 DIAGNOSIS — D05.11 DUCTAL CARCINOMA IN SITU (DCIS) OF RIGHT BREAST: Primary | ICD-10-CM

## 2020-09-10 PROCEDURE — 77336 RADIATION PHYSICS CONSULT: CPT

## 2020-09-10 PROCEDURE — 77412 RADIATION TX DELIVERY LVL 3: CPT

## 2020-09-10 NOTE — PROGRESS NOTES
Breast Cancer     Ms. 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 She was diagnosed on core biopsy 7/9/20. The area of concern was identified on screening imaging. She denies palpable mass. She denies nipple discharge. She denies breast pain. There is no family history of breast cancer. Her father had non-Hodgkin's cancer. Her mother had unknown cancer, possibly pancreatic. Her most recent previous mammogram was 4/8/19. Posterior margin and anterior margin were negative but close. Posterior margin was chest wall. I recommended reexcision for anterior margin, however Ms. Brie Dumont was hesitant to proceed with additional surgery. Given that the surgical cavity was very close to skin and non-invasive nature of DCIS, the decision was made to proceed with radiation per Dr. Nereyda Deutsch. Breast/GYN history:    OB History    No obstetric history on file. Past Medical History:   Diagnosis Date    Diabetes ([facility] [address]     Hypercholesteremia     Hypercholesterolemia     Hypertension     Menopause        Past Surgical History:   Procedure Laterality Date    HX BREAST BIOPSY Right 07/27/2020    ul guided biopsy    HX MASTECTOMY Right 7/31/2020    Right Partial Mastectomy with Localization performed by Sarath Montoya MD at [address]    jaw surgery       Current Outpatient Medications on File Prior to Visit   Medication Sig Dispense Refill    lisinopriL (PRINIVIL, ZESTRIL) 10 mg tablet TK 1 T PO  QD. TAKE WITH HYDROCHLOROTHIAZIDE 12.5 MG.  hydroCHLOROthiazide (HYDRODIURIL) 12.5 mg tablet TK 1 T PO QD. TK WITH LISINOPRIL 10 MG.  aspirin delayed-release 81 mg tablet Take 81 mg by mouth daily.  glimepiride (AMARYL) 2 mg tablet       rosuvastatin (CRESTOR) 20 mg tablet daily. No current facility-administered medications on file prior to visit.         No Known Allergies    Social History Tobacco Use    Smoking status: Former Smoker    Smokeless tobacco: Never Used   Substance Use Topics    Alcohol use: Yes     Comment: occasional social     Drug use: Never       Family History   Problem Relation Age of Onset    Cancer Mother     Cancer Father         lymphoma    Heart Disease Father          ROS: positives are bolded  General: fevers, chills, night sweats, fatigue, weight loss, weight gain  GI: nausea, vomiting, abdominal pain, change in bowel habits, hematochezia, melena, GERD  Integ: dermatitis, abnormal moles  HEENT: visual changes, vertigo, epistaxis, dysphagia, hoarseness  Cardiac: chest pain, palpitations, HTN, edema, varicosities  Resp: cough, shortness of breath, wheezing, hemoptysis, snoring, reactive airway disease  : hematuria, dysuria, frequency, urgency, nocturia, stress urinary incontinence   MSK: weakness, joint pain, arthritis  Endocrine:  thyroid disease, polyuria, polydipsia, polyphagia, poor wound healing, heat intolerance, cold intolerance  Lymph/Heme: anemia, bruising, history of blood transfusions  Neuro: dizziness, headache, fainting, seizures, stroke  Psych: anxiety, depression    Physical Exam:  Visit Vitals  /81   Pulse 70   Temp 97.7 °F (36.5 °C) (Oral)   Resp 18   Ht 5' 6\" (1.676 m)   Wt 75.3 kg (166 lb)   BMI 26.79 kg/m²       Gen:  No distress  Head: normocephalic, atraumatic  Mouth: Clear, no overt lesions, oral mucosa pink and moist  Neck: supple, no masses, no adenopathy, trachea midline  Resp: clear bilaterally  Cardio: Regular rate and rhythm  Abdomen: soft, nontender, nondistended  Extremeties: warm, well-perfused  Neuro: sensation and strength grossly intact and symmetrical  Psych: alert and oriented to person, place and time  Breasts: palpation deferred today  Right: Examined in both the supine and upright positions. There was no supraclavicular, infraclavicular, or axillary lympadenopathy.    There were no dominant masses, no skin changes, no asymmetry identified incision clean upper outer, healing well, acute radiation changes  Left: Examined in both the supine and upright positions. There was no supraclavicular, infraclavicular, or axillary lympadenopathy. There were no dominant masses, no skin changes, no asymmetry identified       Imagin20 right mammo/ultrasound  Developing asymmetry with associated indeterminate microcalcifications in the upper outer posterior third of the right breast for which biopsy is indicated.     Results discussed with the patient at the time of the examination.     BI-RADS Assessment Category 4: Suspicious     6/15/20 bilateral mammogram  Right breast focal asymmetry and clustered calcifications. Recommend diagnostic  right breast tomosynthesis mammogram and possible right breast ultrasound for  further evaluation. A result letter will be sent to the patient.     BI-RADS Assessment Category 0: Incomplete. Need additional imaging evaluation. Pathology:  20   RIGHT BREAST MASS, LUMPECTOMY:   DUCTAL CARCINOMA IN SITU. SPECIMEN   Procedure: Excision (less than total mastectomy)   Specimen Laterality: Right   TUMOR   Histologic Type: Ductal carcinoma in situ   Size (Extent) of DCIS: 24 mm   Nuclear Grade: Grade III (high)   Necrosis: Present, central (expansive \"comedo\" necrosis)   MARGINS   Margins: Uninvolved by DCIS   Distance from Closest Margin (Millimeters): Less than: 1 mm   Closest Margin: Anterior, Posterior   LYMPH NODES   Regional Lymph Nodes: No lymph nodes submitted or found   PATHOLOGIC STAGE CLASSIFICATION (pTNM, AJCC 8th Edition)   Primary Tumor (pT): pTis (DCIS)   Regional Lymph Nodes (pN): pNX     20   RIGHT BREAST, CORE BIOPSIES:   DUCTAL CARCINOMA IN SITU, COMEDO TYPE, NUCLEAR GRADE 3. Estrogen Receptor (ER): Negative (0%, Internal controls present and stain as expected).      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 Radiation per Dr. Delman Litten. I have recommended medical oncology consultation. Follow up 3 months. All questions were answered. She was asked to call with any additional questions or concerns.        Cornelius Hillman MD

## 2020-09-10 NOTE — PATIENT INSTRUCTIONS
If you have any questions or concerns about today's appointment, the verbal and/or written instructions you were given for follow up care, please call our office at [phone]. [facility] - [address], [address] 
 
[phone] office [phone]

## 2020-09-10 NOTE — PROGRESS NOTES
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    1. Have you been to the ER, urgent care clinic since your last visit? Hospitalized since your last visit? No    2. Have you seen or consulted any other health care providers outside of the [address] [place] [address] since your last visit? Include any pap smears or colon screening.  No

## 2020-09-10 NOTE — NURSE NAVIGATOR
Follow up visit with Dr. Cristhian Butler s/p partial mastectomy 7/31/20. Patient is doing well. Presently having adjuvant radiation. Consult appointment with med/onc-Dr. Georgia Gutierrez scheduled for 9/24/2020. She will follow up with Dr. Cristhian Butler in 3 months. Pt referred to counseling with [facility] as NCCS distress form was 7 and positive for fears, depression, sadness, nervousness. Appointment scheduled for 9/28/20 at 10 am [facility].

## 2020-09-11 ENCOUNTER — HOSPITAL ENCOUNTER (OUTPATIENT)
Dept: RADIATION THERAPY | Age: 65
Discharge: HOME | End: 2020-09-11
Payer: OTHER GOVERNMENT

## 2020-09-11 PROCEDURE — 77412 RADIATION TX DELIVERY LVL 3: CPT

## 2020-09-11 PROCEDURE — 77417 THER RADIOLOGY PORT IMAGE(S): CPT

## 2020-09-14 ENCOUNTER — HOSPITAL ENCOUNTER (OUTPATIENT)
Dept: RADIATION THERAPY | Age: 65
Discharge: HOME | End: 2020-09-14
Payer: OTHER GOVERNMENT

## 2020-09-14 PROCEDURE — 77412 RADIATION TX DELIVERY LVL 3: CPT

## 2020-09-15 ENCOUNTER — HOSPITAL ENCOUNTER (OUTPATIENT)
Dept: RADIATION THERAPY | Age: 65
Discharge: HOME | End: 2020-09-15
Payer: OTHER GOVERNMENT

## 2020-09-15 PROCEDURE — 77412 RADIATION TX DELIVERY LVL 3: CPT

## 2020-09-16 ENCOUNTER — HOSPITAL ENCOUNTER (OUTPATIENT)
Dept: RADIATION THERAPY | Age: 65
Discharge: HOME | End: 2020-09-16
Payer: OTHER GOVERNMENT

## 2020-09-16 PROCEDURE — 77412 RADIATION TX DELIVERY LVL 3: CPT

## 2020-09-17 ENCOUNTER — HOSPITAL ENCOUNTER (OUTPATIENT)
Dept: RADIATION THERAPY | Age: 65
Discharge: HOME | End: 2020-09-17
Payer: OTHER GOVERNMENT

## 2020-09-17 PROCEDURE — 77412 RADIATION TX DELIVERY LVL 3: CPT

## 2020-09-18 ENCOUNTER — HOSPITAL ENCOUNTER (OUTPATIENT)
Dept: RADIATION THERAPY | Age: 65
Discharge: HOME | End: 2020-09-18
Payer: OTHER GOVERNMENT

## 2020-09-18 PROCEDURE — 77280 THER RAD SIMULAJ FIELD SMPL: CPT

## 2020-09-18 PROCEDURE — 77412 RADIATION TX DELIVERY LVL 3: CPT

## 2020-09-21 ENCOUNTER — HOSPITAL ENCOUNTER (OUTPATIENT)
Dept: RADIATION THERAPY | Age: 65
Discharge: HOME | End: 2020-09-21
Payer: OTHER GOVERNMENT

## 2020-09-21 PROCEDURE — 77412 RADIATION TX DELIVERY LVL 3: CPT

## 2020-09-22 ENCOUNTER — TELEPHONE (OUTPATIENT)
Age: 65
End: 2020-09-22

## 2020-09-22 ENCOUNTER — HOSPITAL ENCOUNTER (OUTPATIENT)
Dept: RADIATION THERAPY | Age: 65
Discharge: HOME | End: 2020-09-22
Payer: OTHER GOVERNMENT

## 2020-09-22 PROCEDURE — 77412 RADIATION TX DELIVERY LVL 3: CPT

## 2020-09-22 NOTE — TELEPHONE ENCOUNTER
Left message on voicemail for patient to call office and confirm appointment for 9/23 @2:30pm with 2:00pm arrival for paperwork.

## 2020-09-23 ENCOUNTER — HOSPITAL ENCOUNTER (OUTPATIENT)
Dept: RADIATION THERAPY | Age: 65
Discharge: HOME | End: 2020-09-23
Payer: OTHER GOVERNMENT

## 2020-09-23 ENCOUNTER — OFFICE VISIT (OUTPATIENT)
Age: 65
End: 2020-09-23
Payer: OTHER GOVERNMENT

## 2020-09-23 VITALS
BODY MASS INDEX: 26.29 KG/M2 | DIASTOLIC BLOOD PRESSURE: 81 MMHG | WEIGHT: 163.6 LBS | OXYGEN SATURATION: 95 % | HEART RATE: 73 BPM | SYSTOLIC BLOOD PRESSURE: 138 MMHG | HEIGHT: 66 IN | TEMPERATURE: 97.7 F

## 2020-09-23 DIAGNOSIS — D05.11 DUCTAL CARCINOMA IN SITU (DCIS) OF RIGHT BREAST: Primary | ICD-10-CM

## 2020-09-23 PROCEDURE — 99204 OFFICE O/P NEW MOD 45 MIN: CPT | Performed by: INTERNAL MEDICINE

## 2020-09-23 PROCEDURE — 77412 RADIATION TX DELIVERY LVL 3: CPT

## 2020-09-23 NOTE — LETTER
9/23/20 Patient: Adelia Cowden YOB: 1955 Date of Visit: 9/23/2020 Elba Harvey MD 
[address] [facility],[address] [place] [address] VIA Facsimile: [phone] Padma Samuel MD 
[address] [id number] VIA In Basket Dear MD Padma Calderón MD, Thank you for referring Ms. Menjiavr Guillermina to [address] [place] [address] for evaluation. My notes for this consultation are attached. If you have questions, please do not hesitate to call me. I look forward to following your patient along with you.  
 
 
Sincerely, 
 
Leticia Plummer MD

## 2020-09-23 NOTE — PROGRESS NOTES
[facility]  [address] [place] [address], [address]  [address] [facility] [facility]  Office Phone: [phone]  Fax: [phone]      Reason for visit:  DCIS    HPI:   Jj Rodríguez is a 63 y.o.  female menarche at age 17, first pregnancy at age 24, used birth control pills of/on for 5 years, has no family h/o cancer, who I was asked to see in consultation at the request of So Delgadillo  for evaluation for DCIS. On ROS she denies any fever, chills, fatigue, NV or abdominal pain. No bleeding. No diarrhea or constipation. All other points of review of system have been reviewed and were negative. ECOG PS=0    DX   Encounter Diagnosis   Name Primary?  Ductal carcinoma in situ (DCIS) of right breast Yes        STAGE:  Stage 0 (DCIS)    ONCOLOGY HISTORY:   6/15/20 bilateral mammogram  Right breast focal asymmetry and clustered calcifications. Recommend diagnostic  right breast tomosynthesis mammogram and possible right breast ultrasound for  further evaluation. A result letter will be sent to the patient. BI-RADS Assessment Category 0: Incomplete. Need additional imaging evaluation.     6/22/20 right mammo/ultrasound  Developing asymmetry with associated indeterminate microcalcifications in the upper outer posterior third of the right breast for which biopsy is indicated. BI-RADS Assessment Category 4: Suspicious     7/9/20   RIGHT BREAST, CORE BIOPSIES:  DUCTAL CARCINOMA IN SITU, COMEDO TYPE, NUCLEAR GRADE 3. Estrogen Receptor (ER): Negative (0%, Internal controls present and stain as expected).      7/31/20   RIGHT BREAST MASS, LUMPECTOMY:  DUCTAL CARCINOMA IN SITU.    SPECIMEN   Procedure: Excision (less than total mastectomy)   Specimen Laterality: Right   TUMOR   Histologic Type: Ductal carcinoma in situ   Size (Extent) of DCIS: 24 mm   Nuclear Grade: Grade III (high)   Necrosis: Present, central (expansive \"comedo\" necrosis)   MARGINS   Margins: Uninvolved by DCIS   Distance from Closest Margin (Millimeters): Less than: 1 mm   Closest Margin: Anterior, Posterior   LYMPH NODES   Regional Lymph Nodes: No lymph nodes submitted or found   PATHOLOGIC STAGE CLASSIFICATION (pTNM, AJCC 8th Edition)   Primary Tumor (pT): pTis (DCIS)   Regional Lymph Nodes (pN): pNX     9/23/20: [facility]          Past Medical History:   Diagnosis Date    Diabetes ([address]     Hypercholesteremia     Hypercholesterolemia     Hypertension     Menopause      Past Surgical History:   Procedure Laterality Date    HX BREAST BIOPSY Right 07/27/2020    ul guided biopsy    HX MASTECTOMY Right 7/31/2020    Right Partial Mastectomy with Localization performed by Lissette Vivar MD at [facility] MA[facility] OR    HX ORTHOPAEDIC  1983    jaw surgery     Social History     Socioeconomic History    Marital status:      Spouse name: Not on file    Number of children: Not on file    Years of education: Not on file    Highest education level: Not on file   Tobacco Use    Smoking status: Former Smoker    Smokeless tobacco: Never Used   Substance and Sexual Activity    Alcohol use: Yes     Comment: occasional social     Drug use: Never     Family History   Problem Relation Age of [place]    Cancer Mother     Cancer Father         lymphoma    Heart Disease Father        Current Outpatient Medications   Medication Sig Dispense Refill    lisinopriL (PRINIVIL, ZESTRIL) 10 mg tablet TK 1 T PO  QD. TAKE WITH HYDROCHLOROTHIAZIDE 12.5 MG.  hydroCHLOROthiazide (HYDRODIURIL) 12.5 mg tablet TK 1 T PO QD. TK WITH LISINOPRIL 10 MG.  aspirin delayed-release 81 mg tablet Take 81 mg by mouth daily.  glimepiride (AMARYL) 2 mg tablet       rosuvastatin (CRESTOR) 20 mg tablet daily. No Known Allergies    Review of Systems  As per HPI    Objective:  Physical Exam:  There were no vitals taken for this visit. General:  Alert, cooperative, no distress, appears stated age.    Head:  Normocephalic, without obvious abnormality, atraumatic. Eyes:  Conjunctivae/corneas clear. PERRL, EOMs intact. Throat: Lips, mucosa, and tongue normal.    Neck: Supple, symmetrical, trachea midline, no adenopathy, thyroid: no enlargement/tenderness/nodules   Back:   Symmetric, no curvature. ROM normal. No CVA tenderness. Lungs:   Clear to auscultation bilaterally. Chest wall:  No tenderness or deformity. Breast: RT skin changes on right breast. No mass felt   Heart:  Regular rate and rhythm, S1, S2 normal, no murmur, click, rub or gallop. Abdomen:   Soft, non-tender. Bowel sounds normal. No masses,  No organomegaly. Extremities: Extremities normal, atraumatic, no cyanosis or edema. Skin: Skin color, texture, turgor normal. No rashes or lesions. Lymph nodes: Cervical, supraclavicular, and axillary nodes normal.   Neurologic: CNII-XII intact. Diagnostic Imaging     Lab Results  Lab Results   Component Value Date/Time    WBC 6.6 07/27/2020 07:14 AM    HGB 13.7 07/27/2020 07:14 AM    HCT 40.5 07/27/2020 07:14 AM    PLATELET 501 20/38/2176 07:14 AM    MCV 92.7 07/27/2020 07:14 AM       Lab Results   Component Value Date/Time    Sodium 139 07/27/2020 07:14 AM    Potassium 4.6 07/27/2020 07:14 AM    Chloride 105 07/27/2020 07:14 AM    CO2 31 07/27/2020 07:14 AM    Anion gap 3 07/27/2020 07:14 AM    Glucose 120 (H) 07/27/2020 07:14 AM    BUN 16 07/27/2020 07:14 AM    Creatinine 0.81 07/27/2020 07:14 AM    BUN/Creatinine ratio 20 07/27/2020 07:14 AM    GFR est AA >60 07/27/2020 07:14 AM    GFR est non-AA >60 07/27/2020 07:14 AM    Calcium 8.8 07/27/2020 07:14 AM   F/U with PCP for health maintenance    Assessment/Plan:  63 y.o. female with  1. Ductal carcinoma in situ (DCIS) of right breast  Patient has ER negative DCIS and will be completing adjuvant radiation therapy tomorrow 9/24/20.  I told her that since she has ER (-) DCIS, hormone blockade with tamoxifen or aromatase inhibitors will not the ipsilateral breast but have been shown to prevent recurrence the contralateral breast. However after discussions, patient opted not to hormone blockade treatment and would rather only have follow-up mammogram. All of her questions were answered. She expressed full understanding. *F/U radonc as scheduled  *No tamoxifen or AI as per above  *Check labs as below  *Yearly mammogram    - CBC WITH AUTOMATED DIFF; Future  - FERRITIN; Future  - IRON PROFILE; Future  - METABOLIC PANEL, COMPREHENSIVE; Future  - TSH 3RD GENERATION; Future  - VITAMIN B12 & FOLATE;  Future    Return in 6 months    CC:  CC:  CC:

## 2020-09-24 ENCOUNTER — HOSPITAL ENCOUNTER (OUTPATIENT)
Dept: RADIATION THERAPY | Age: 65
Discharge: HOME | End: 2020-09-24
Payer: OTHER GOVERNMENT

## 2020-09-24 PROCEDURE — 77336 RADIATION PHYSICS CONSULT: CPT

## 2020-09-24 PROCEDURE — 77412 RADIATION TX DELIVERY LVL 3: CPT

## 2020-10-02 ENCOUNTER — HOSPITAL ENCOUNTER (OUTPATIENT)
Dept: INFUSION THERAPY | Age: 65
Discharge: HOME | End: 2020-10-02
Payer: OTHER GOVERNMENT

## 2020-10-02 DIAGNOSIS — D05.11 DUCTAL CARCINOMA IN SITU (DCIS) OF RIGHT BREAST: ICD-10-CM

## 2020-10-02 LAB
ALBUMIN SERPL-MCNC: 3.9 G/DL (ref 3.4–5)
ALBUMIN/GLOB SERPL: 1.2 {RATIO} (ref 0.8–1.7)
ALP SERPL-CCNC: 67 U/L (ref 45–117)
ALT SERPL-CCNC: 38 U/L (ref 13–56)
ANION GAP SERPL CALC-SCNC: 3 MMOL/L (ref 3–18)
AST SERPL-CCNC: 23 U/L (ref 10–38)
BASO+EOS+MONOS # BLD AUTO: 0.5 K/UL (ref 0–2.3)
BASO+EOS+MONOS NFR BLD AUTO: 9 % (ref 0.1–17)
BILIRUB SERPL-MCNC: 0.4 MG/DL (ref 0.2–1)
BUN SERPL-MCNC: 12 MG/DL (ref 7–18)
BUN/CREAT SERPL: 16 (ref 12–20)
CALCIUM SERPL-MCNC: 9.3 MG/DL (ref 8.5–10.1)
CHLORIDE SERPL-SCNC: 104 MMOL/L (ref 100–111)
CO2 SERPL-SCNC: 31 MMOL/L (ref 21–32)
CREAT SERPL-MCNC: 0.76 MG/DL (ref 0.6–1.3)
DIFFERENTIAL METHOD BLD: NORMAL
ERYTHROCYTE [DISTWIDTH] IN BLOOD BY AUTOMATED COUNT: 11.6 % (ref 11.5–14.5)
FERRITIN SERPL-MCNC: 140 NG/ML (ref 8–388)
FOLATE SERPL-MCNC: 11.2 NG/ML (ref 3.1–17.5)
GLOBULIN SER CALC-MCNC: 3.3 G/DL (ref 2–4)
GLUCOSE SERPL-MCNC: 69 MG/DL (ref 74–99)
HCT VFR BLD AUTO: 40.8 % (ref 36–48)
HGB BLD-MCNC: 13.7 G/DL (ref 12–16)
IRON SATN MFR SERPL: 31 % (ref 20–50)
IRON SERPL-MCNC: 100 UG/DL (ref 50–175)
LYMPHOCYTES # BLD: 1.2 K/UL (ref 1.1–5.9)
LYMPHOCYTES NFR BLD: 22 % (ref 14–44)
MCH RBC QN AUTO: 30.8 PG (ref 25–35)
MCHC RBC AUTO-ENTMCNC: 33.6 G/DL (ref 31–37)
MCV RBC AUTO: 91.7 FL (ref 78–102)
NEUTS SEG # BLD: 3.5 K/UL (ref 1.8–9.5)
NEUTS SEG NFR BLD: 68 % (ref 40–70)
PLATELET # BLD AUTO: 213 K/UL (ref 140–440)
POTASSIUM SERPL-SCNC: 4.3 MMOL/L (ref 3.5–5.5)
PROT SERPL-MCNC: 7.2 G/DL (ref 6.4–8.2)
RBC # BLD AUTO: 4.45 M/UL (ref 4.1–5.1)
SODIUM SERPL-SCNC: 138 MMOL/L (ref 136–145)
TIBC SERPL-MCNC: 323 UG/DL (ref 250–450)
TSH SERPL DL<=0.05 MIU/L-ACNC: 1.22 UIU/ML (ref 0.36–3.74)
VIT B12 SERPL-MCNC: 599 PG/ML (ref 211–911)
WBC # BLD AUTO: 5.2 K/UL (ref 4.5–13)

## 2020-10-02 PROCEDURE — 36415 COLL VENOUS BLD VENIPUNCTURE: CPT

## 2020-10-02 PROCEDURE — 82728 ASSAY OF FERRITIN: CPT

## 2020-10-02 PROCEDURE — 84443 ASSAY THYROID STIM HORMONE: CPT

## 2020-10-02 PROCEDURE — 82607 VITAMIN B-12: CPT

## 2020-10-02 PROCEDURE — 83540 ASSAY OF IRON: CPT

## 2020-10-02 PROCEDURE — 80053 COMPREHEN METABOLIC PANEL: CPT

## 2020-10-02 PROCEDURE — 85025 COMPLETE CBC W/AUTO DIFF WBC: CPT

## 2020-10-02 NOTE — PROGRESS NOTES
[facility] Progress Note    Date: 2020    Name: Madeline Oden    MRN: [id number]         : 1955    Peripheral Lab Draw    Recent Results (from the past 12 hour(s))   METABOLIC PANEL, COMPREHENSIVE    Collection Time: 10/02/20  9:16 AM   Result Value Ref Range    Sodium 138 136 - 145 mmol/L    Potassium 4.3 3.5 - 5.5 mmol/L    Chloride 104 100 - 111 mmol/L    CO2 31 21 - 32 mmol/L    Anion gap 3 3.0 - 18 mmol/L    Glucose 69 (L) 74 - 99 mg/dL    BUN 12 7.0 - 18 MG/DL    Creatinine 0.76 0.6 - 1.3 MG/DL    BUN/Creatinine ratio 16 12 - 20      GFR est AA >60 >60 ml/min/1.73m2    GFR est non-AA >60 >60 ml/min/1.73m2    Calcium 9.3 8.5 - 10.1 MG/DL    Bilirubin, total 0.4 0.2 - 1.0 MG/DL    ALT (SGPT) 38 13 - 56 U/L    AST (SGOT) 23 10 - 38 U/L    Alk. phosphatase 67 45 - 117 U/L    Protein, total 7.2 6.4 - 8.2 g/dL    Albumin 3.9 3.4 - 5.0 g/dL    Globulin 3.3 2.0 - 4.0 g/dL    A-G Ratio 1.2 0.8 - 1.7     TSH 3RD GENERATION    Collection Time: 10/02/20  9:16 AM   Result Value Ref Range    TSH 1.22 0.36 - 3.74 uIU/mL   CBC WITH 3 PART DIFF    Collection Time: 10/02/20  9:16 AM   Result Value Ref Range    WBC 5.2 4.5 - 13.0 K/uL    RBC 4.45 4.10 - 5.10 M/uL    HGB 13.7 12.0 - 16.0 g/dL    HCT 40.8 36 - 48 %    MCV 91.7 78 - 102 FL    MCH 30.8 25.0 - 35.0 PG    MCHC 33.6 31 - 37 g/dL    RDW 11.6 11.5 - 14.5 %    PLATELET 509 454 - 191 K/uL    NEUTROPHILS 68 40 - 70 %    MIXED CELLS 9 0.1 - 17 %    LYMPHOCYTES 22 14 - 44 %    ABS. NEUTROPHILS 3.5 1.8 - 9.5 K/UL    ABS. MIXED CELLS 0.5 0.0 - 2.3 K/uL    ABS. LYMPHOCYTES 1.2 1.1 - 5.9 K/UL    DF AUTOMATED         Ms. Juares to [facility], ambulatory at 0906 accompanied by self. Pt was assessed and education was provided. Ms. Jeanine Miller vitals were reviewed and patient was observed for 5 minutes prior to treatment.      Blood obtained peripherally from left arm x 1 attempt with butterfly needle and sent to lab for Cbc w/diff, Cmp, Iron Profile, Ferritin, Tsh, Vitamin B12 & Folate per written orders. No bleeding or hematoma noted at site. Gauze and coban applied. Ms. José Antonio Donaldson tolerated the phlebotomy, and had no complaints. Patient armband removed and shredded. Ms. José Antonio Donaldson was discharged from [place] [address] in stable condition at 3400.      Dougie Barroso Phlebotomist PCT  October 2, 2020  3:16 PM

## 2020-11-05 ENCOUNTER — HOSPITAL ENCOUNTER (OUTPATIENT)
Dept: RADIATION THERAPY | Age: 65
Discharge: HOME | End: 2020-11-05
Payer: OTHER GOVERNMENT

## 2020-11-05 PROCEDURE — 99211 OFF/OP EST MAY X REQ PHY/QHP: CPT

## 2020-12-02 ENCOUNTER — NURSE NAVIGATOR (OUTPATIENT)
Dept: OTHER | Age: 65
End: 2020-12-02

## 2020-12-02 NOTE — NURSE NAVIGATOR
.Survivorship care plan discussed with patient via phone. All questions answered. Patient will call if needed in the future should she have any questions. Copy of plan mailed to patient and a copy faxed to PCP's office. No further f/u from navigator needed.

## 2020-12-09 NOTE — PROGRESS NOTES
Breast Cancer     Ms. 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 She was diagnosed on core biopsy 7/9/20. The area of concern was identified on screening imaging. She denies palpable mass. She denies nipple discharge. She denies breast pain. There is no family history of breast cancer. Her father had non-Hodgkin's cancer. Her mother had unknown cancer, possibly pancreatic. Her most recent previous mammogram was 4/8/19. Posterior margin and anterior margin were negative but close. Posterior margin was chest wall. I recommended reexcision for anterior margin, however Ms. Shirley Jones was hesitant to proceed with additional surgery. Given that the surgical cavity was very close to skin and non-invasive nature of DCIS, the decision was made to proceed with radiation without additional surgery. She has completed radiation per Dr. Shauna Carreon. She discussed antiestrogen therapy with Dr. Kelby Maciel but opted not to proceed. Breast/GYN history:    OB History    No obstetric history on file. Past Medical History:   Diagnosis Date    Diabetes ([address]     Hypercholesteremia     Hypercholesterolemia     Hypertension     Menopause        Past Surgical History:   Procedure Laterality Date    HX BREAST BIOPSY Right 07/27/2020    ul guided biopsy    HX MASTECTOMY Right 7/31/2020    Right Partial Mastectomy with Localization performed by Cally Ferrer MD [address]    jaw surgery       Current Outpatient Medications on File Prior to Visit   Medication Sig Dispense Refill    lisinopriL (PRINIVIL, ZESTRIL) 10 mg tablet TK 1 T PO  QD. TAKE WITH HYDROCHLOROTHIAZIDE 12.5 MG.  hydroCHLOROthiazide (HYDRODIURIL) 12.5 mg tablet TK 1 T PO QD. TK WITH LISINOPRIL 10 MG.  glimepiride (AMARYL) 2 mg tablet       rosuvastatin (CRESTOR) 20 mg tablet daily.       aspirin delayed-release 81 mg tablet Take 81 mg by mouth daily. No current facility-administered medications on file prior to visit.         No Known Allergies    Social History     Tobacco Use    Smoking status: Former Smoker    Smokeless tobacco: Never Used   Substance Use Topics    Alcohol use: Yes     Comment: occasional     Drug use: Never       Family History   Problem Relation Age of Onset    Cancer Mother     Cancer Father         lymphoma    Heart Disease Father          ROS: positives are bolded  General: fevers, chills, night sweats, fatigue, weight loss, weight gain  GI: nausea, vomiting, abdominal pain, change in bowel habits, hematochezia, melena, GERD  Integ: dermatitis, abnormal moles  HEENT: visual changes, vertigo, epistaxis, dysphagia, hoarseness  Cardiac: chest pain, palpitations, HTN, edema, varicosities  Resp: cough, shortness of breath, wheezing, hemoptysis, snoring, reactive airway disease  : hematuria, dysuria, frequency, urgency, nocturia, stress urinary incontinence   MSK: weakness, joint pain, arthritis  Endocrine:  thyroid disease, polyuria, polydipsia, polyphagia, poor wound healing, heat intolerance, cold intolerance  Lymph/Heme: anemia, bruising, history of blood transfusions  Neuro: dizziness, headache, fainting, seizures, stroke  Psych: anxiety, depression    Physical Exam:  Visit Vitals  /78 (BP 1 Location: Left arm, BP Patient Position: Sitting)   Pulse (!) 57   Temp 97.3 °F (36.3 °C) (Skin)   Ht 5' 6\" (1.676 m)   Wt 74.5 kg (164 lb 3.2 oz)   BMI 26.50 kg/m²       Gen:  No distress  Head: normocephalic, atraumatic  Mouth: Clear, no overt lesions, oral mucosa pink and moist  Neck: supple, no masses, no adenopathy, trachea midline  Resp: clear bilaterally  Cardio: Regular rate and rhythm  Abdomen: soft, nontender, nondistended  Extremeties: warm, well-perfused  Neuro: sensation and strength grossly intact and symmetrical  Psych: alert and oriented to person, place and time  Breasts:    Right: Examined in both the supine and upright positions. There was no supraclavicular, infraclavicular, or axillary lympadenopathy. There were no dominant masses, no skin changes, no asymmetry identified incision clean upper outer, healing well, minimal radiation changes  Left: Examined in both the supine and upright positions. There was no supraclavicular, infraclavicular, or axillary lympadenopathy. There were no dominant masses, no skin changes, no asymmetry identified       Imagin20 right mammo/ultrasound  Developing asymmetry with associated indeterminate microcalcifications in the upper outer posterior third of the right breast for which biopsy is indicated.     Results discussed with the patient at the time of the examination.     BI-RADS Assessment Category 4: Suspicious     6/15/20 bilateral mammogram  Right breast focal asymmetry and clustered calcifications. Recommend diagnostic  right breast tomosynthesis mammogram and possible right breast ultrasound for  further evaluation. A result letter will be sent to the patient.     BI-RADS Assessment Category 0: Incomplete. Need additional imaging evaluation. Pathology:  20   RIGHT BREAST MASS, LUMPECTOMY:   DUCTAL CARCINOMA IN SITU. SPECIMEN   Procedure: Excision (less than total mastectomy)   Specimen Laterality: Right   TUMOR   Histologic Type: Ductal carcinoma in situ   Size (Extent) of DCIS: 24 mm   Nuclear Grade: Grade III (high)   Necrosis: Present, central (expansive \"comedo\" necrosis)   MARGINS   Margins: Uninvolved by DCIS   Distance from Closest Margin (Millimeters): Less than: 1 mm   Closest Margin: Anterior, Posterior   LYMPH NODES   Regional Lymph Nodes: No lymph nodes submitted or found   PATHOLOGIC STAGE CLASSIFICATION (pTNM, AJCC 8th Edition)   Primary Tumor (pT): pTis (DCIS)   Regional Lymph Nodes (pN): pNX     20   RIGHT BREAST, CORE BIOPSIES:   DUCTAL CARCINOMA IN SITU, COMEDO TYPE, NUCLEAR GRADE 3.    Estrogen Receptor (ER): Negative (0%, Internal controls present and stain as expected). 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 She is due for bilateral mammogram by  June 2021. Follow up after imaging. All questions were answered. She was asked to call with any additional questions or concerns.        Ayde Shearer MD

## 2020-12-10 ENCOUNTER — OFFICE VISIT (OUTPATIENT)
Dept: SURGERY | Age: 65
End: 2020-12-10
Payer: OTHER GOVERNMENT

## 2020-12-10 VITALS
BODY MASS INDEX: 26.39 KG/M2 | HEART RATE: 57 BPM | HEIGHT: 66 IN | TEMPERATURE: 97.3 F | WEIGHT: 164.2 LBS | DIASTOLIC BLOOD PRESSURE: 78 MMHG | SYSTOLIC BLOOD PRESSURE: 138 MMHG

## 2020-12-10 DIAGNOSIS — D05.11 DUCTAL CARCINOMA IN SITU (DCIS) OF RIGHT BREAST: Primary | ICD-10-CM

## 2020-12-10 PROCEDURE — 99213 OFFICE O/P EST LOW 20 MIN: CPT | Performed by: SURGERY

## 2020-12-10 NOTE — PROGRESS NOTES
Dana Ray is a 63 y.o. female (: 1955) presenting to address:    Chief Complaint   Patient presents with    Follow-up     3 month f/u/ DCIS of right breast/partial Mastectomy 20       Medication list and allergies have been reviewed with Rubyaminah Cory and updated as of today's date. I have gone over all Medical, Surgical and Social History with Dana Cory and updated/added the information accordingly. 1. Have you been to the ER, Urgent Care or Hospitalized since your last visit? NO      2. Have you followed up with your PCP or any other Physicians since your procedure/ last office visit? YES.  Routine care

## 2020-12-10 NOTE — LETTER
12/10/2020 9:25 AM 
 
Patient:  Fernando Thorpe YOB: 1955 Date of Visit: 12/10/2020 Carlitos Bacon MD 
[address] [place] [address],[facility] Floor [address] [place] [id number] VIA Facsimile: [phone] Dear Carlitos Bacon MD, 
 
 
I had the pleasure of seeing Ms. Christoph Olivas in my office today for her breast cancer. I am including a copy of my office visit today. If you have questions, please do not hesitate to call me. I look forward to following Ms. Juares along with you and will keep you updated as to her progress. Sincerely, Guilherme Hummel MD

## 2021-03-23 ENCOUNTER — VIRTUAL VISIT (OUTPATIENT)
Age: 66
End: 2021-03-23
Payer: OTHER GOVERNMENT

## 2021-03-23 DIAGNOSIS — D05.11 DUCTAL CARCINOMA IN SITU (DCIS) OF RIGHT BREAST: Primary | ICD-10-CM

## 2021-03-23 PROCEDURE — 99442 PR PHYS/QHP TELEPHONE EVALUATION 11-20 MIN: CPT | Performed by: INTERNAL MEDICINE

## 2021-03-23 NOTE — PROGRESS NOTES
Brie Mulligan is a 61 y.o. female who was seen by synchronous (real-time) audio- technology on 3/23/2021 for Breast Cancer (DCIS)        Assessment & Plan:   Diagnoses and all orders for this visit:    1. Ductal carcinoma in situ (DCIS) of right breast        The complexity of medical decision making for this visit is moderate       1. Ductal carcinoma in situ (DCIS) of right breast  Patient has ER negative DCIS and will be completing adjuvant radiation therapy tomorrow 9/24/20. I told her that since she has ER (-) DCIS, hormone blockade with tamoxifen or aromatase inhibitors will not the ipsilateral breast but have been shown to prevent recurrence the contralateral breast. However after discussions, patient opted not to hormone blockade treatment and would rather only have follow-up mammogram. All of her questions were answered. She expressed full understanding. *F/U radonc as scheduled  *No tamoxifen or AI as per above  *Yearly mammogram due in July 2021.     I spent at least 15 minutes on this visit with this established patient. 712  Subjective:   Brie Mulligan is a 63 y.o.  female menarche at age 17, first pregnancy at age 24, used birth control pills of/on for 5 years, has no family h/o cancer, who I was asked to see in consultation at the request of Kristina Alberts  for evaluation for DCIS. Patient was found to have ER negative DCIS and therefore is not on any hormonal blockade treatment. She is here today for follow-up.     On ROS she denies any fever, chills, fatigue, NV or abdominal pain. No bleeding. No diarrhea or constipation. All other points of review of system have been reviewed and were negative. ECOG PS=0     DX        Encounter Diagnosis   Name Primary?  Ductal carcinoma in situ (DCIS) of right breast Yes         STAGE:  Stage 0 (DCIS)     ONCOLOGY HISTORY:   6/15/20 bilateral mammogram  Right breast focal asymmetry and clustered calcifications.  Recommend diagnostic  right breast tomosynthesis mammogram and possible right breast ultrasound for  further evaluation. A result letter will be sent to the patient. BI-RADS Assessment Category 0: Incomplete. Need additional imaging evaluation.     6/22/20 right mammo/ultrasound  Developing asymmetry with associated indeterminate microcalcifications in the upper outer posterior third of the right breast for which biopsy is indicated. BI-RADS Assessment Category 4: Suspicious      7/9/20   RIGHT BREAST, CORE BIOPSIES:  DUCTAL CARCINOMA IN SITU, COMEDO TYPE, NUCLEAR GRADE 3. Estrogen Receptor (ER): Negative (0%, Internal controls present and stain as expected).       7/31/20   RIGHT BREAST MASS, LUMPECTOMY:  DUCTAL CARCINOMA IN SITU. SPECIMEN   Procedure: Excision (less than total mastectomy)   Specimen Laterality: Right   TUMOR   Histologic Type: Ductal carcinoma in situ   Size (Extent) of DCIS: 24 mm   Nuclear Grade: Grade III (high)   Necrosis: Present, central (expansive \"comedo\" necrosis)   MARGINS   Margins: Uninvolved by DCIS   Distance from Closest Margin (Millimeters): Less than: 1 mm   Closest Margin: Anterior, Posterior   LYMPH NODES   Regional Lymph Nodes: No lymph nodes submitted or found   PATHOLOGIC STAGE CLASSIFICATION (pTNM, AJCC 8th Edition)   Primary Tumor (pT): pTis (DCIS)   Regional Lymph Nodes (pN): pNX      9/23/20: [facility]-3/28/21:F/U visit         Prior to Admission medications    Medication Sig Start Date End Date Taking? Authorizing Provider   lisinopriL (PRINIVIL, ZESTRIL) 10 mg tablet TK 1 T PO  QD. TAKE WITH HYDROCHLOROTHIAZIDE 12.5 MG. 7/20/20  Yes Provider, Historical   hydroCHLOROthiazide (HYDRODIURIL) 12.5 mg tablet TK 1 T PO QD. TK WITH LISINOPRIL 10 MG. 7/20/20  Yes Provider, Historical   glimepiride (AMARYL) 2 mg tablet  5/5/20  Yes Provider, Historical   rosuvastatin (CRESTOR) 20 mg tablet daily. 5/29/20  Yes Provider, Historical   aspirin delayed-release 81 mg tablet Take 81 mg by mouth daily. Provider, Historical     Patient Active Problem List   Diagnosis Code    DCIS (ductal carcinoma in situ) D05.10     Patient Active Problem List    Diagnosis Date Noted    DCIS (ductal carcinoma in situ) 07/16/2020     Current Outpatient Medications   Medication Sig Dispense Refill    lisinopriL (PRINIVIL, ZESTRIL) 10 mg tablet TK 1 T PO  QD. TAKE WITH HYDROCHLOROTHIAZIDE 12.5 MG.  hydroCHLOROthiazide (HYDRODIURIL) 12.5 mg tablet TK 1 T PO QD. TK WITH LISINOPRIL 10 MG.  glimepiride (AMARYL) 2 mg tablet       rosuvastatin (CRESTOR) 20 mg tablet daily.  aspirin delayed-release 81 mg tablet Take 81 mg by mouth daily. No Known Allergies  Past Medical History:   Diagnosis Date    Diabetes ([facility] [address]     Hypercholesteremia     Hypercholesterolemia     Hypertension     Menopause      Past Surgical History:   Procedure Laterality Date    HX BREAST BIOPSY Right 07/27/2020    ul guided biopsy    HX MASTECTOMY Right 7/31/2020    Right Partial Mastectomy with Localization performed by Lamonte Silva MD at [facility] MA[facility] OR     [address]    jaw surgery     Family History   Problem Relation Age of Onset    Cancer Mother     Cancer Father         lymphoma    Heart Disease Father      Social History     Tobacco Use    Smoking status: Former Smoker    Smokeless tobacco: Never Used   Substance Use Topics    Alcohol use: Yes     Comment: occasional        ROS: As per HPI    Objective:   No flowsheet data found. General: alert, cooperative, no distress     Additional exam findings: We discussed the expected course, resolution and complications of the diagnosis(es) in detail. Medication risks, benefits, costs, interactions, and alternatives were discussed as indicated. I advised her to contact the office if her condition worsens, changes or fails to improve as anticipated. She expressed understanding with the diagnosis(es) and plan.      Daiana Evans, was evaluated through a synchronous (real-time) audio- encounter. The patient (or guardian if applicable) is aware that this is a billable service. Verbal consent to proceed has been obtained within the past 12 months. The visit was conducted pursuant to the emergency declaration under the [address] [place] [address], [address] [place] [address] authority and the [employer] Act. Patient identification was verified, and a caregiver was present when appropriate. The patient was located in a state where the provider was credentialed to provide care.     Kat Carroll MD

## 2021-03-25 ENCOUNTER — TRANSCRIBE ORDER (OUTPATIENT)
Dept: SCHEDULING | Age: 66
End: 2021-03-25

## 2021-03-25 DIAGNOSIS — D05.11 INTRADUCTAL CARCINOMA IN SITU OF RIGHT BREAST: Primary | ICD-10-CM

## 2021-04-07 ENCOUNTER — HOSPITAL ENCOUNTER (OUTPATIENT)
Dept: WOMENS IMAGING | Age: 66
Discharge: HOME | End: 2021-04-07
Attending: RADIOLOGY
Payer: OTHER GOVERNMENT

## 2021-04-07 ENCOUNTER — HOSPITAL ENCOUNTER (OUTPATIENT)
Dept: ULTRASOUND IMAGING | Age: 66
Discharge: HOME | End: 2021-04-07
Attending: RADIOLOGY
Payer: OTHER GOVERNMENT

## 2021-04-07 DIAGNOSIS — D05.11 INTRADUCTAL CARCINOMA IN SITU OF RIGHT BREAST: ICD-10-CM

## 2021-04-07 PROCEDURE — 77062 BREAST TOMOSYNTHESIS BI: CPT

## 2021-04-16 ENCOUNTER — APPOINTMENT (OUTPATIENT)
Dept: RADIATION THERAPY | Age: 66
End: 2021-04-16

## 2021-04-21 ENCOUNTER — HOSPITAL ENCOUNTER (OUTPATIENT)
Dept: RADIATION THERAPY | Age: 66
Discharge: HOME | End: 2021-04-21
Payer: OTHER GOVERNMENT

## 2021-04-21 PROCEDURE — 99211 OFF/OP EST MAY X REQ PHY/QHP: CPT

## 2021-05-25 ENCOUNTER — HOSPITAL ENCOUNTER (OUTPATIENT)
Dept: LAB | Age: 66
Discharge: HOME | End: 2021-05-25
Payer: OTHER GOVERNMENT

## 2021-05-25 PROCEDURE — 87624 HPV HI-RISK TYP POOLED RSLT: CPT

## 2021-05-26 ENCOUNTER — TRANSCRIBE ORDER (OUTPATIENT)
Dept: SCHEDULING | Age: 66
End: 2021-05-26

## 2021-05-26 DIAGNOSIS — Z12.31 VISIT FOR SCREENING MAMMOGRAM: Primary | ICD-10-CM

## 2021-05-28 LAB
CYTOLOGIST CVX/VAG CYTO: NORMAL
CYTOLOGY CVX/VAG DOC THIN PREP: NORMAL
HPV APTIMA: NEGATIVE
Lab: NORMAL
PATH REPORT.FINAL DX SPEC: NORMAL
STAT OF ADQ CVX/VAG CYTO-IMP: NORMAL

## 2021-07-01 ENCOUNTER — OFFICE VISIT (OUTPATIENT)
Dept: SURGERY | Age: 66
End: 2021-07-01
Payer: OTHER GOVERNMENT

## 2021-07-01 VITALS
HEART RATE: 71 BPM | BODY MASS INDEX: 26.36 KG/M2 | TEMPERATURE: 97.7 F | DIASTOLIC BLOOD PRESSURE: 77 MMHG | RESPIRATION RATE: 20 BRPM | HEIGHT: 66 IN | SYSTOLIC BLOOD PRESSURE: 139 MMHG | WEIGHT: 164 LBS

## 2021-07-01 DIAGNOSIS — D05.11 DUCTAL CARCINOMA IN SITU (DCIS) OF RIGHT BREAST: Primary | ICD-10-CM

## 2021-07-01 PROCEDURE — 99213 OFFICE O/P EST LOW 20 MIN: CPT | Performed by: SURGERY

## 2021-07-01 NOTE — LETTER
7/1/2021 10:14 AM    Patient:  Jeny Gupta   YOB: 1955  Date of Visit: 7/1/2021      Nba Ulloa MD  [address] [facility],[address]  [place] [address]  Via Fax: [phone]    Dear Nba Ulloa MD,      I had the pleasure of seeing Ms. Gurdeep Lopez in my office today for her breast cancer. I am including a copy of my office visit today. If you have questions, please do not hesitate to call me. I look forward to following Ms. Juares along with you and will keep you updated as to her progress.            Sincerely,      Brigido Pizano MD

## 2021-07-01 NOTE — PROGRESS NOTES
Breast Cancer     Ms. 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 She was diagnosed on core biopsy 7/9/20. The area of concern was identified on screening imaging. She denies palpable mass. She denies nipple discharge. She denies breast pain. There is no family history of breast cancer. Her father had non-Hodgkin's cancer. Her mother had unknown cancer, possibly pancreatic. Her most recent previous mammogram was 4/8/19. Posterior margin and anterior margin were negative but close. Posterior margin was chest wall. I recommended reexcision for anterior margin, however Ms. Rupesh Chandler was hesitant to proceed with additional surgery. Given that the surgical cavity was very close to skin and non-invasive nature of DCIS, the decision was made to proceed with radiation without additional surgery. She completed radiation per Dr. Delarosa Officer. She discussed antiestrogen therapy with Dr. Jayant Florez but opted not to proceed. She is very pleased with her result. Breast/GYN history:    OB History    No obstetric history on file. Past Medical History:   Diagnosis Date    Diabetes ([facility] [address]     Hypercholesteremia     Hypercholesterolemia     Hypertension     Menopause        Past Surgical History:   Procedure Laterality Date    HX BREAST BIOPSY Right 07/27/2020    ul guided biopsy    HX MASTECTOMY Right 7/31/2020    Right Partial Mastectomy with Localization performed by Wilmer Swann MD at [address]    jaw surgery       Current Outpatient Medications on File Prior to Visit   Medication Sig Dispense Refill    lisinopriL (PRINIVIL, ZESTRIL) 10 mg tablet TK 1 T PO  QD. TAKE WITH HYDROCHLOROTHIAZIDE 12.5 MG.  hydroCHLOROthiazide (HYDRODIURIL) 12.5 mg tablet TK 1 T PO QD. TK WITH LISINOPRIL 10 MG.  glimepiride (AMARYL) 2 mg tablet       rosuvastatin (CRESTOR) 20 mg tablet daily.       aspirin delayed-release 81 mg tablet Take 81 mg by mouth daily. No current facility-administered medications on file prior to visit.        No Known Allergies    Social History     Tobacco Use    Smoking status: Former Smoker    Smokeless tobacco: Never Used   Substance Use Topics    Alcohol use: Yes     Comment: occasional     Drug use: Never       Family History   Problem Relation Age of Onset    Cancer Mother     Cancer Father         lymphoma    Heart Disease Father          ROS: positives are bolded  General: fevers, chills, night sweats, fatigue, weight loss, weight gain  GI: nausea, vomiting, abdominal pain, change in bowel habits, hematochezia, melena, GERD  Integ: dermatitis, abnormal moles  HEENT: visual changes, vertigo, epistaxis, dysphagia, hoarseness  Cardiac: chest pain, palpitations, HTN, edema, varicosities  Resp: cough, shortness of breath, wheezing, hemoptysis, snoring, reactive airway disease  : hematuria, dysuria, frequency, urgency, nocturia, stress urinary incontinence   MSK: weakness, joint pain, arthritis  Endocrine:  thyroid disease, polyuria, polydipsia, polyphagia, poor wound healing, heat intolerance, cold intolerance  Lymph/Heme: anemia, bruising, history of blood transfusions  Neuro: dizziness, headache, fainting, seizures, stroke  Psych: anxiety, depression    Physical Exam:  Visit Vitals  /77   Pulse 71   Temp 97.7 °F (36.5 °C) (Skin)   Resp 20   Ht 5' 6\" (1.676 m)   Wt 74.4 kg (164 lb)   BMI 26.47 kg/m²       Gen:  No distress  Head: normocephalic, atraumatic  Mouth: Clear, no overt lesions, oral mucosa pink and moist  Neck: supple, no masses, no adenopathy, trachea midline  Resp: clear bilaterally  Cardio: Regular rate and rhythm  Abdomen: soft, nontender, nondistended  Extremeties: warm, well-perfused  Neuro: sensation and strength grossly intact and symmetrical  Psych: alert and oriented to person, place and time  Breasts:    Right: Examined in both the supine and upright positions. There was no supraclavicular, infraclavicular, or axillary lympadenopathy. There were no dominant masses, no skin changes, no asymmetry identified incision clean upper outer, healing well, minimal radiation changes  Left: Examined in both the supine and upright positions. There was no supraclavicular, infraclavicular, or axillary lympadenopathy. There were no dominant masses, no skin changes, no asymmetry identified       Imagin21 bilateral mammogram  No mammographic evidence of malignancy. Routine annual follow-up advised.           BIRADS 2:  Benign  Breast density B: There are scattered areas of fibroglandular density    20 right mammo/ultrasound  Developing asymmetry with associated indeterminate microcalcifications in the upper outer posterior third of the right breast for which biopsy is indicated.     Results discussed with the patient at the time of the examination.     BI-RADS Assessment Category 4: Suspicious     6/15/20 bilateral mammogram  Right breast focal asymmetry and clustered calcifications. Recommend diagnostic  right breast tomosynthesis mammogram and possible right breast ultrasound for  further evaluation. A result letter will be sent to the patient.     BI-RADS Assessment Category 0: Incomplete. Need additional imaging evaluation. Pathology:  20   RIGHT BREAST MASS, LUMPECTOMY:   DUCTAL CARCINOMA IN SITU.    SPECIMEN   Procedure: Excision (less than total mastectomy)   Specimen Laterality: Right   TUMOR   Histologic Type: Ductal carcinoma in situ   Size (Extent) of DCIS: 24 mm   Nuclear Grade: Grade III (high)   Necrosis: Present, central (expansive \"comedo\" necrosis)   MARGINS   Margins: Uninvolved by DCIS   Distance from Closest Margin (Millimeters): Less than: 1 mm   Closest Margin: Anterior, Posterior   LYMPH NODES   Regional Lymph Nodes: No lymph nodes submitted or found   PATHOLOGIC STAGE CLASSIFICATION (pTNM, AJCC 8th Edition) Primary Tumor (pT): pTis (DCIS)   Regional Lymph Nodes (pN): pNX     7/9/20   RIGHT BREAST, CORE BIOPSIES:   DUCTAL CARCINOMA IN SITU, COMEDO TYPE, NUCLEAR GRADE 3. Estrogen Receptor (ER): Negative (0%, Internal controls present and stain as expected). 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 She is due for bilateral mammogram by  April 2022. Follow up 6 months. All questions were answered. She was asked to call with any additional questions or concerns.        Leroy Samuel MD

## 2021-07-01 NOTE — PROGRESS NOTES
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 She was diagnosed on core biopsy 7/9/20.     1. Have you been to the ER, urgent care clinic since your last visit? Hospitalized since your last visit? No    2. Have you seen or consulted any other health care providers outside of the [address] [place] [address] since your last visit? Include any pap smears or colon screening.  No

## 2021-09-24 ENCOUNTER — VIRTUAL VISIT (OUTPATIENT)
Age: 66
End: 2021-09-24
Payer: OTHER GOVERNMENT

## 2021-09-24 DIAGNOSIS — D05.11 DUCTAL CARCINOMA IN SITU (DCIS) OF RIGHT BREAST: Primary | ICD-10-CM

## 2021-09-24 PROCEDURE — 99442 PR PHYS/QHP TELEPHONE EVALUATION 11-20 MIN: CPT | Performed by: INTERNAL MEDICINE

## 2021-09-24 NOTE — PROGRESS NOTES
Louis Sainz is a 61 y.o. female who was seen by synchronous (real-time) audio- technology on 9/24/2021 for No chief complaint on file. Assessment & Plan:   Diagnoses and all orders for this visit:    1. Ductal carcinoma in situ (DCIS) of right breast        The complexity of medical decision making for this visit is moderate       1. Ductal carcinoma in situ (DCIS) of right breast  Patient has ER negative DCIS and will be completing adjuvant radiation therapy tomorrow 9/24/20. I told her that since she has ER (-) DCIS, hormone blockade with tamoxifen or aromatase inhibitors will not the ipsilateral breast but have been shown to prevent recurrence the contralateral breast. However after discussions, patient opted not to hormone blockade treatment and would rather only have follow-up mammogram. All of her questions were answered. She expressed full understanding. *8/27/20209/24/2020: Completed adjuvant radiation therapy. *No tamoxifen or AI as per above  *Mammogram on 4/7/2021 was negative BI-RADS 2. Next mammogram due in April 2022. *Continue surveillance as previously. RTC 6 months     I spent at least 15 minutes on this visit with this established patient. 712  Subjective:   Louis Sainz is a 61 y.o.  female menarche at age 17, first pregnancy at age 24, used birth control pills of/on for 5 years, has no family h/o cancer, who I was asked to see in consultation at the request of Leonie Bill  for evaluation for DCIS. Patient was found to have ER negative DCIS and therefore is not on any hormonal blockade treatment. She is here today for follow-up.     On ROS she denies any fever, chills, fatigue, NV or abdominal pain. No bleeding. No diarrhea or constipation. All other points of review of system have been reviewed and were negative. ECOG PS=0     DX        Encounter Diagnosis   Name Primary?     Ductal carcinoma in situ (DCIS) of right breast Yes         STAGE:  Stage 0 (DCIS)     ONCOLOGY HISTORY:   6/15/20 bilateral mammogram  Right breast focal asymmetry and clustered calcifications. Recommend diagnostic  right breast tomosynthesis mammogram and possible right breast ultrasound for  further evaluation. A result letter will be sent to the patient. BI-RADS Assessment Category 0: Incomplete. Need additional imaging evaluation.     6/22/20 right mammo/ultrasound  Developing asymmetry with associated indeterminate microcalcifications in the upper outer posterior third of the right breast for which biopsy is indicated. BI-RADS Assessment Category 4: Suspicious      7/9/20   RIGHT BREAST, CORE BIOPSIES:  DUCTAL CARCINOMA IN SITU, COMEDO TYPE, NUCLEAR GRADE 3. Estrogen Receptor (ER): Negative (0%, Internal controls present and stain as expected).       7/31/20   RIGHT BREAST MASS, LUMPECTOMY:  DUCTAL CARCINOMA IN SITU. SPECIMEN   Procedure: Excision (less than total mastectomy)   Specimen Laterality: Right   TUMOR   Histologic Type: Ductal carcinoma in situ   Size (Extent) of DCIS: 24 mm   Nuclear Grade: Grade III (high)   Necrosis: Present, central (expansive \"comedo\" necrosis)   MARGINS   Margins: Uninvolved by DCIS   Distance from Closest Margin (Millimeters): Less than: 1 mm   Closest Margin: Anterior, Posterior   LYMPH NODES   Regional Lymph Nodes: No lymph nodes submitted or found   PATHOLOGIC STAGE CLASSIFICATION (pTNM, AJCC 8th Edition)   Primary Tumor (pT): pTis (DCIS)   Regional Lymph Nodes (pN): pNX      9/23/20: [facility]-3/28/21:F/U visit         Prior to Admission medications    Medication Sig Start Date End Date Taking? Authorizing Provider   lisinopriL (PRINIVIL, ZESTRIL) 10 mg tablet TK 1 T PO  QD. TAKE WITH HYDROCHLOROTHIAZIDE 12.5 MG. 7/20/20   Provider, Historical   hydroCHLOROthiazide (HYDRODIURIL) 12.5 mg tablet TK 1 T PO QD. TK WITH LISINOPRIL 10 MG. 7/20/20   Provider, Historical   aspirin delayed-release 81 mg tablet Take 81 mg by mouth daily.     Provider, Historical   glimepiride (AMARYL) 2 mg tablet  5/5/20   Provider, Historical   rosuvastatin (CRESTOR) 20 mg tablet daily. 5/29/20   Provider, Historical     Patient Active Problem List   Diagnosis Code    DCIS (ductal carcinoma in situ) D05.10     Patient Active Problem List    Diagnosis Date Noted    DCIS (ductal carcinoma in situ) 07/16/2020     Current Outpatient Medications   Medication Sig Dispense Refill    lisinopriL (PRINIVIL, ZESTRIL) 10 mg tablet TK 1 T PO  QD. TAKE WITH HYDROCHLOROTHIAZIDE 12.5 MG.  hydroCHLOROthiazide (HYDRODIURIL) 12.5 mg tablet TK 1 T PO QD. TK WITH LISINOPRIL 10 MG.  aspirin delayed-release 81 mg tablet Take 81 mg by mouth daily.  glimepiride (AMARYL) 2 mg tablet       rosuvastatin (CRESTOR) 20 mg tablet daily. No Known Allergies  Past Medical History:   Diagnosis Date    Diabetes ([facility] [address]     Hypercholesteremia     Hypercholesterolemia     Hypertension     Menopause      Past Surgical History:   Procedure Laterality Date    HX BREAST BIOPSY Right 07/27/2020    ul guided biopsy    HX MASTECTOMY Right 7/31/2020    Right Partial Mastectomy with Localization performed [facility] Manuel Flores [facility]    [address]    jaw surgery     Family History   Problem Relation Age of Onset    Cancer Mother     Cancer Father         lymphoma    Heart Disease Father      Social History     Tobacco Use    Smoking status: Former Smoker    Smokeless tobacco: Never Used   Substance Use Topics    Alcohol use: Yes     Comment: occasional        ROS: As per HPI    Objective:   No flowsheet data found. General: alert, cooperative, no distress     Additional exam findings: We discussed the expected course, resolution and complications of the diagnosis(es) in detail. Medication risks, benefits, costs, interactions, and alternatives were discussed as indicated.   I advised her to contact the office if her condition worsens, changes or fails to improve as anticipated. She expressed understanding with the diagnosis(es) and plan. [place], was evaluated through a synchronous (real-time) audio- encounter. The patient (or guardian if applicable) is aware that this is a billable service. Verbal consent to proceed has been obtained within the past 12 months. The visit was conducted pursuant to the emergency declaration under the [address] [place] [address] and the [employer] and [employer] General Act. Patient identification was verified, and a caregiver was present when appropriate. The patient was located in a state where the provider was credentialed to provide care.     [place] Cano MD

## 2022-03-15 ENCOUNTER — TELEPHONE (OUTPATIENT)
Age: 67
End: 2022-03-15

## 2022-03-15 DIAGNOSIS — D05.11 DUCTAL CARCINOMA IN SITU (DCIS) OF RIGHT BREAST: Primary | ICD-10-CM

## 2022-03-16 ENCOUNTER — HOSPITAL ENCOUNTER (OUTPATIENT)
Dept: RADIATION THERAPY | Age: 67
Discharge: HOME | End: 2022-03-16
Payer: MEDICARE

## 2022-03-16 PROCEDURE — 99211 OFF/OP EST MAY X REQ PHY/QHP: CPT

## 2022-03-16 PROCEDURE — 99212 OFFICE O/P EST SF 10 MIN: CPT | Performed by: RADIOLOGY

## 2022-03-20 PROBLEM — D05.10 DCIS (DUCTAL CARCINOMA IN SITU): Status: ACTIVE | Noted: 2020-07-16

## 2022-03-28 ENCOUNTER — HOSPITAL ENCOUNTER (OUTPATIENT)
Dept: INFUSION THERAPY | Age: 67
Discharge: HOME | End: 2022-03-28
Payer: MEDICARE

## 2022-03-28 DIAGNOSIS — D05.11 DUCTAL CARCINOMA IN SITU (DCIS) OF RIGHT BREAST: ICD-10-CM

## 2022-03-28 LAB
25(OH)D3 SERPL-MCNC: 30.9 NG/ML (ref 30–100)
ALBUMIN SERPL-MCNC: 4.2 G/DL (ref 3.4–5)
ALBUMIN/GLOB SERPL: 1.6 {RATIO} (ref 0.8–1.7)
ALP SERPL-CCNC: 70 U/L (ref 45–117)
ALT SERPL-CCNC: 30 U/L (ref 13–56)
ANION GAP SERPL CALC-SCNC: 4 MMOL/L (ref 3–18)
AST SERPL-CCNC: 20 U/L (ref 10–38)
BASO+EOS+MONOS # BLD AUTO: 0.4 K/UL (ref 0–2.3)
BASO+EOS+MONOS NFR BLD AUTO: 8 % (ref 0.1–17)
BILIRUB SERPL-MCNC: 0.5 MG/DL (ref 0.2–1)
BUN SERPL-MCNC: 18 MG/DL (ref 7–18)
BUN/CREAT SERPL: 23 (ref 12–20)
CALCIUM SERPL-MCNC: 9.5 MG/DL (ref 8.5–10.1)
CHLORIDE SERPL-SCNC: 109 MMOL/L (ref 100–111)
CO2 SERPL-SCNC: 31 MMOL/L (ref 21–32)
CREAT SERPL-MCNC: 0.79 MG/DL (ref 0.6–1.3)
DIFFERENTIAL METHOD BLD: ABNORMAL
ERYTHROCYTE [DISTWIDTH] IN BLOOD BY AUTOMATED COUNT: 11.3 % (ref 11.5–14.5)
FOLATE SERPL-MCNC: 19.4 NG/ML (ref 3.1–17.5)
GLOBULIN SER CALC-MCNC: 2.7 G/DL (ref 2–4)
GLUCOSE SERPL-MCNC: 86 MG/DL (ref 74–99)
HCT VFR BLD AUTO: 40.6 % (ref 36–48)
HGB BLD-MCNC: 13.5 G/DL (ref 12–16)
LYMPHOCYTES # BLD: 1.6 K/UL (ref 1.1–5.9)
LYMPHOCYTES NFR BLD: 34 % (ref 14–44)
MCH RBC QN AUTO: 31 PG (ref 25–35)
MCHC RBC AUTO-ENTMCNC: 33.3 G/DL (ref 31–37)
MCV RBC AUTO: 93.1 FL (ref 78–102)
NEUTS SEG # BLD: 2.8 K/UL (ref 1.8–9.5)
NEUTS SEG NFR BLD: 57 % (ref 40–70)
PLATELET # BLD AUTO: 236 K/UL (ref 140–440)
POTASSIUM SERPL-SCNC: 4.1 MMOL/L (ref 3.5–5.5)
PROT SERPL-MCNC: 6.9 G/DL (ref 6.4–8.2)
RBC # BLD AUTO: 4.36 M/UL (ref 4.1–5.1)
SODIUM SERPL-SCNC: 144 MMOL/L (ref 136–145)
VIT B12 SERPL-MCNC: 506 PG/ML (ref 211–911)
WBC # BLD AUTO: 4.8 K/UL (ref 4.5–13)

## 2022-03-28 PROCEDURE — 82306 VITAMIN D 25 HYDROXY: CPT

## 2022-03-28 PROCEDURE — 36415 COLL VENOUS BLD VENIPUNCTURE: CPT

## 2022-03-28 PROCEDURE — 82607 VITAMIN B-12: CPT

## 2022-03-28 PROCEDURE — 80053 COMPREHEN METABOLIC PANEL: CPT

## 2022-03-28 PROCEDURE — 85025 COMPLETE CBC W/AUTO DIFF WBC: CPT

## 2022-03-28 NOTE — PROGRESS NOTES
[facility] Progress Note    Date: 2022    Name: Magdalena Carrington    MRN: [id number]         : 1955    Peripheral Lab Draw      Ms. Juares to [facility], ambulatory at 1140 accompanied by self. Pt was assessed and education was provided. Ms. Charmaine Holly vitals were reviewed and patient was observed for 5 minutes prior to treatment. There were no vitals taken for this visit. Blood obtained peripherally from left arm x 1 attempt with butterfly needle and sent to lab per written orders. No bleeding or hematoma noted at site. Gauze and coban applied. Ms. Janki Henriquez tolerated the phlebotomy, and had no complaints. Patient armband removed and shredded. Ms. Janki Henriquez was discharged from [place] [address] in stable condition at 1150.      Marlene Frederick Phlebotomist PCT  2022  12:44 PM

## 2022-04-06 ENCOUNTER — DOCUMENTATION ONLY (OUTPATIENT)
Age: 67
End: 2022-04-06

## 2022-04-06 ENCOUNTER — VIRTUAL VISIT (OUTPATIENT)
Age: 67
End: 2022-04-06
Payer: MEDICARE

## 2022-04-06 DIAGNOSIS — E55.9 VITAMIN D DEFICIENCY: ICD-10-CM

## 2022-04-06 DIAGNOSIS — D05.11 DUCTAL CARCINOMA IN SITU (DCIS) OF RIGHT BREAST: Primary | ICD-10-CM

## 2022-04-06 PROCEDURE — 99443 PR PHYS/QHP TELEPHONE EVALUATION 21-30 MIN: CPT | Performed by: INTERNAL MEDICINE

## 2022-04-06 RX ORDER — ACETAMINOPHEN 500 MG
2000 TABLET ORAL DAILY
Qty: 90 CAPSULE | Refills: 3 | Status: SHIPPED | OUTPATIENT
Start: 2022-04-06 | End: 2023-04-01

## 2022-04-06 NOTE — PROGRESS NOTES
Brisa Pimentel is a 74 y.o. female, evaluated via audio-only technology on 4/6/2022 for DCIS hormone receptor negative. Patient elected not to take antiestrogen to reduce contralateral breast cancer DCIS. John Thomson Assessment & Plan:   Doing very well no evidence of cancer or DCIS recurrence by physical exam radiation oncology. Patient's mammogram is due within several weeks  Patient  mammogram will be followed by radiation  Patient's laboratory tests are good. Only issue is that vitamin D level is at the very lowest edge of acceptable. Patient started taking tablets of vitamin D and had a rash she stopped and the rash resolved. We shall prescribe gelatin capsules with vitamin D hopefully she will do better with this  And will follow up with us in 1 year. Of course would be very glad to see the patient earlier if there is any problems. Patient is in agreement with this plan  12  Subjective:     Kenton Juares is a 61 y. o.  female menarche at age 17, first pregnancy at age 24, used birth control pills of/on for 5 years, has no family h/o cancer, who I was asked to see in consultation at the request of   for evaluation for DCIS. Patient was found to have ER negative DCIS and therefore is not on any hormonal blockade treatment. She is here today for follow-up.     On ROS she denies any fever, chills, fatigue, NV or abdominal pain. No bleeding. No diarrhea or constipation. All other points of review of system have been reviewed and were negative. ECOG PS=0         Prior to Admission medications    Medication Sig Start Date End Date Taking? Authorizing Provider   lisinopriL (PRINIVIL, ZESTRIL) 10 mg tablet TK 1 T PO  QD. TAKE WITH HYDROCHLOROTHIAZIDE 12.5 MG. 7/20/20  Yes Provider, Historical   hydroCHLOROthiazide (HYDRODIURIL) 12.5 mg tablet TK 1 T PO QD.  TK WITH LISINOPRIL 10 MG. 7/20/20  Yes Provider, Historical   glimepiride (AMARYL) 2 mg tablet  5/5/20  Yes Provider, Historical   rosuvastatin (CRESTOR) 20 mg tablet daily. 5/29/20  Yes Provider, Historical   aspirin delayed-release 81 mg tablet Take 81 mg by mouth daily. Provider, Historical     Patient Active Problem List   Diagnosis Code    DCIS (ductal carcinoma in situ) D05.10     Current Outpatient Medications   Medication Sig Dispense Refill    lisinopriL (PRINIVIL, ZESTRIL) 10 mg tablet TK 1 T PO  QD. TAKE WITH HYDROCHLOROTHIAZIDE 12.5 MG.  hydroCHLOROthiazide (HYDRODIURIL) 12.5 mg tablet TK 1 T PO QD. TK WITH LISINOPRIL 10 MG.  glimepiride (AMARYL) 2 mg tablet       rosuvastatin (CRESTOR) 20 mg tablet daily.  aspirin delayed-release 81 mg tablet Take 81 mg by mouth daily. No Known Allergies  Past Medical History:   Diagnosis Date    Diabetes ([facility] [address]     Hypercholesteremia     Hypercholesterolemia     Hypertension     Menopause            Patient-Reported Vitals 4/6/2022   Patient-Reported Weight 163       Ange Left, who was evaluated through a patient-initiated, synchronous (real-time) audio only encounter, and/or her healthcare decision maker, is aware that it is a billable service, with coverage as determined by her insurance carrier. She provided verbal consent to proceed: Yes. She has not had a related appointment within my department in the past 7 days or scheduled within the next 24 hours. On this date 04/06/2022 I have spent 29 minutes reviewing previous notes, test results and voice to voice (virtual) with the patient discussing the diagnosis and importance of compliance with the treatment plan as well as documenting on the day of the visit.     Jeannette Chamorro MD

## 2022-04-11 ENCOUNTER — HOSPITAL ENCOUNTER (OUTPATIENT)
Dept: WOMENS IMAGING | Age: 67
Discharge: HOME | End: 2022-04-11
Attending: OBSTETRICS & GYNECOLOGY
Payer: MEDICARE

## 2022-04-11 DIAGNOSIS — Z12.31 VISIT FOR SCREENING MAMMOGRAM: ICD-10-CM

## 2022-04-11 PROCEDURE — 77063 BREAST TOMOSYNTHESIS BI: CPT

## 2022-08-17 ENCOUNTER — TRANSCRIBE ORDER (OUTPATIENT)
Dept: SCHEDULING | Age: 67
End: 2022-08-17

## 2022-08-17 DIAGNOSIS — Z13.820 OSTEOPOROSIS SCREENING: ICD-10-CM

## 2022-08-17 DIAGNOSIS — M81.0 AGE RELATED OSTEOPOROSIS: Primary | ICD-10-CM

## 2022-08-29 ENCOUNTER — HOSPITAL ENCOUNTER (OUTPATIENT)
Dept: BONE DENSITY | Age: 67
Discharge: HOME | End: 2022-08-29
Attending: FAMILY MEDICINE
Payer: MEDICARE

## 2022-08-29 DIAGNOSIS — M81.0 AGE RELATED OSTEOPOROSIS: ICD-10-CM

## 2022-08-29 DIAGNOSIS — Z13.820 OSTEOPOROSIS SCREENING: ICD-10-CM

## 2022-08-29 PROCEDURE — 77080 DXA BONE DENSITY AXIAL: CPT

## 2023-02-03 DIAGNOSIS — E55.9 VITAMIN D DEFICIENCY: ICD-10-CM

## 2023-02-03 DIAGNOSIS — D05.11 DUCTAL CARCINOMA IN SITU (DCIS) OF RIGHT BREAST: Primary | ICD-10-CM

## 2023-02-04 DIAGNOSIS — E55.9 VITAMIN D DEFICIENCY: ICD-10-CM

## 2023-02-04 DIAGNOSIS — D05.11 DUCTAL CARCINOMA IN SITU (DCIS) OF RIGHT BREAST: Primary | ICD-10-CM

## 2023-03-14 ENCOUNTER — TRANSCRIBE ORDERS (OUTPATIENT)
Facility: HOSPITAL | Age: 68
End: 2023-03-14

## 2023-03-14 DIAGNOSIS — Z12.31 SCREENING MAMMOGRAM, ENCOUNTER FOR: Primary | ICD-10-CM

## 2023-04-05 ENCOUNTER — APPOINTMENT (OUTPATIENT)
Dept: INFUSION THERAPY | Age: 68
End: 2023-04-05

## 2023-04-13 ENCOUNTER — HOSPITAL ENCOUNTER (OUTPATIENT)
Dept: WOMENS IMAGING | Facility: HOSPITAL | Age: 68
Discharge: HOME | End: 2023-04-16
Payer: MEDICARE

## 2023-04-13 DIAGNOSIS — Z12.31 SCREENING MAMMOGRAM, ENCOUNTER FOR: ICD-10-CM

## 2023-04-13 PROCEDURE — 77063 BREAST TOMOSYNTHESIS BI: CPT

## 2024-04-15 ENCOUNTER — HOSPITAL ENCOUNTER (OUTPATIENT)
Dept: WOMENS IMAGING | Facility: HOSPITAL | Age: 69
Discharge: HOME | End: 2024-04-18
Payer: MEDICARE

## 2024-04-15 DIAGNOSIS — Z12.31 SCREENING MAMMOGRAM FOR BREAST CANCER: ICD-10-CM

## 2024-04-15 PROCEDURE — 77063 BREAST TOMOSYNTHESIS BI: CPT

## 2025-04-21 ENCOUNTER — HOSPITAL ENCOUNTER (OUTPATIENT)
Dept: WOMENS IMAGING | Facility: HOSPITAL | Age: 70
Discharge: HOME | End: 2025-04-24
Payer: MEDICARE

## 2025-04-21 DIAGNOSIS — Z12.31 ENCOUNTER FOR SCREENING MAMMOGRAM FOR MALIGNANT NEOPLASM OF BREAST: ICD-10-CM

## 2025-04-21 PROCEDURE — 77063 BREAST TOMOSYNTHESIS BI: CPT

## 2025-04-29 ENCOUNTER — HOSPITAL ENCOUNTER (OUTPATIENT)
Dept: WOMENS IMAGING | Facility: HOSPITAL | Age: 70
Discharge: HOME | End: 2025-05-02
Payer: MEDICARE

## 2025-04-29 DIAGNOSIS — R92.8 ABNORMAL MAMMOGRAM: ICD-10-CM

## 2025-04-29 PROCEDURE — G0279 TOMOSYNTHESIS, MAMMO: HCPCS

## (undated) DEVICE — SUTURE ABSORBABLE BRAIDED 2-0 CT-1 27 IN UD VICRYL J259H

## (undated) DEVICE — INTENDED FOR TISSUE SEPARATION, AND OTHER PROCEDURES THAT REQUIRE A SHARP SURGICAL BLADE TO PUNCTURE OR CUT.: Brand: BARD-PARKER ® CARBON RIB-BACK BLADES

## (undated) DEVICE — LOCALIZER SURGICAL PROBE

## (undated) DEVICE — ZINACTIVE USE 2641837 CLIP LIG M BLU TI HRT SHP WIRE HORZ 600 PER BX

## (undated) DEVICE — GARMENT,MEDLINE,DVT,INT,CALF,MED, GEN2: Brand: MEDLINE

## (undated) DEVICE — STERILE POLYISOPRENE POWDER-FREE SURGICAL GLOVES: Brand: PROTEXIS

## (undated) DEVICE — (D)PREP SKN CHLRAPRP APPL 26ML -- CONVERT TO ITEM 371833

## (undated) DEVICE — SUTURE VCRL SZ 3-0 L27IN ABSRB UD L26MM SH 1/2 CIR J416H

## (undated) DEVICE — KIT PROC PLAS BRST CUST LF --

## (undated) DEVICE — Device: Brand: LOCALIZER INSTRUMENT COVER KIT

## (undated) DEVICE — SUTURE MCRYL SZ 4-0 L18IN ABSRB UD L19MM PS-2 3/8 CIR PRIM Y496G

## (undated) DEVICE — YANKAUER,FLEXIBLE HANDLE,REGLR CAPACITY: Brand: MEDLINE INDUSTRIES, INC.

## (undated) DEVICE — SUT SLK 2-0SH 30IN BLK --

## (undated) DEVICE — SOL IRRIGATION INJ NACL 0.9% 500ML BTL